# Patient Record
Sex: FEMALE | Race: WHITE | Employment: FULL TIME | URBAN - METROPOLITAN AREA
[De-identification: names, ages, dates, MRNs, and addresses within clinical notes are randomized per-mention and may not be internally consistent; named-entity substitution may affect disease eponyms.]

---

## 2018-08-29 ENCOUNTER — OFFICE VISIT (OUTPATIENT)
Dept: OBGYN CLINIC | Age: 28
End: 2018-08-29

## 2018-08-29 VITALS
WEIGHT: 165.2 LBS | BODY MASS INDEX: 27.52 KG/M2 | HEART RATE: 81 BPM | SYSTOLIC BLOOD PRESSURE: 113 MMHG | DIASTOLIC BLOOD PRESSURE: 78 MMHG | HEIGHT: 65 IN

## 2018-08-29 DIAGNOSIS — Z01.419 WOMEN'S ANNUAL ROUTINE GYNECOLOGICAL EXAMINATION: Primary | ICD-10-CM

## 2018-08-29 DIAGNOSIS — N92.0 MENORRHAGIA WITH REGULAR CYCLE: ICD-10-CM

## 2018-08-29 DIAGNOSIS — N94.6 DYSMENORRHEA: ICD-10-CM

## 2018-08-29 DIAGNOSIS — Z01.419 ENCOUNTER FOR WELL WOMAN EXAM: ICD-10-CM

## 2018-08-29 PROCEDURE — 99395 PREV VISIT EST AGE 18-39: CPT | Performed by: ADVANCED PRACTICE MIDWIFE

## 2018-08-29 RX ORDER — IBUPROFEN 200 MG
200 TABLET ORAL EVERY 6 HOURS PRN
COMMUNITY
End: 2021-07-19

## 2018-08-29 RX ORDER — NORGESTIMATE AND ETHINYL ESTRADIOL 0.25-0.035
1 KIT ORAL DAILY
Qty: 1 PACKET | Refills: 3 | Status: SHIPPED | OUTPATIENT
Start: 2018-08-29 | End: 2018-08-29 | Stop reason: DRUGHIGH

## 2018-08-29 RX ORDER — NORGESTIMATE AND ETHINYL ESTRADIOL 0.25-0.035
1 KIT ORAL DAILY
Qty: 1 PACKET | Refills: 11 | Status: SHIPPED | OUTPATIENT
Start: 2018-08-29 | End: 2019-09-13 | Stop reason: SDUPTHER

## 2018-08-29 NOTE — PROGRESS NOTES
Subjective:     CHIEF COMPLAINT:     Chief Complaint   Patient presents with    Gynecologic Exam     last pap 2014 wnl       HPI    PREVENTIVE HEALTH SCREENING:   Date of last pap: , wnl                HPV typing/date: not done  Abnormal pap smear history: None  Date of last mammogram: None   Date of last colonoscopy: None to date, looking into this given family history    History of Gestational Diabetes: No     If Yes, Glucose screening ordered:No    Preventive screening: No    Family history of Breast, Ovarian, Colon or Uterine Cancer:  Yes, MGM ( from complications of colon CA), some family members with polyps. Father had colon CA but passed away prior to further testing. If Yes see scanned worksheet    Establishing care with this practice as is interested in having more midwives for next pregnancy. No complaints about last practice, just one midwife and had to induce in order to be guaranteed for delivery. Is considering pregnancy in a year or so. Reviewed in depth purpose of pap screen and she is monogamous and only partner is  and vice versa. Would like to skip pap this year. 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 Normal vaginal delivery. Objective:   GYNECOLOGIC HISTORY:   LMP: Patient's last menstrual period was 2018 (exact date). Monthly menses (days): 28 days, JUVENAL    Sexually active: Yes    STD history: No    Hormone Replacement: no    Birth control method :Yes, Selin Hare started at 14 for dysmenorrhea and menorrhagia and off for conception and then back on after delivery. Permanent Sterilization: No    SOCIAL HISTORY:  Seat Belt Use: Yes  Domestic Violence: No    Counseling: No  Regular exercise: Yes, is a runner and does boot camp   Counseling: Yes  Diet discussed: Yes    REVIEW OF SYSTEMS:  1. Constitutional: No fever, chills or malaise. No weight change or fatigue  2. Head and eyes: No headache, dizziness or trauma.   No visual appreciated. Pelvic:  Deferred    Psychiatric:  Alert, oriented to time, place, person and situation. There are no mood or affect changes. Assesment:     Routine annual gynecological exam  Patient Active Problem List   Diagnosis    Encounter for well woman exam     Plan:   1. Return for annual exam.  Pap per current recommendations. 2.  Mammogram: None. SBE was reinforced  3. DEXA scan: None  4. Colonscopy: Will look into with Dr. Esteban Jackson  5. Routine health maintenance: Will look into PCP  6. Hereditary Breast, Ovarian, Colon and Uterine Cancer screening: not done    Patient was seen with total face to face time of 25 minutes. More than 50% of this visit was counseling and education regarding    Diagnosis Orders   1. Women's annual routine gynecological examination  PAP Smear   2.  Encounter for well woman exam

## 2018-08-29 NOTE — LETTER
[facility] and [address] [facility] Danielle Cee [address] [place] [address], [address] [place] [address]  [phone]      August 30, 2018      [facility] [address] [place] [address] 53        Dear Sunday Andujar: Our records indicate that you are due for an annual exam.     The American College of Obstetricians and Gynecologists strongly recommends annual pelvic examination for patient 24years of age and older. Please make an appointment for an annual examination at your earliest convenience. If you are having your annuals done elsewhere, please let us know. Sincerely,    [address] Team     Libby C. Enrique Meigs

## 2018-09-28 PROBLEM — Z01.419 ENCOUNTER FOR WELL WOMAN EXAM: Status: RESOLVED | Noted: 2018-08-29 | Resolved: 2018-09-28

## 2019-09-30 ENCOUNTER — TELEPHONE (OUTPATIENT)
Dept: OBGYN CLINIC | Age: 29
End: 2019-09-30

## 2019-10-01 ENCOUNTER — TELEPHONE (OUTPATIENT)
Dept: OBGYN CLINIC | Age: 29
End: 2019-10-01

## 2019-10-23 ENCOUNTER — OFFICE VISIT (OUTPATIENT)
Dept: OBGYN CLINIC | Age: 29
End: 2019-10-23

## 2019-10-23 VITALS
SYSTOLIC BLOOD PRESSURE: 127 MMHG | DIASTOLIC BLOOD PRESSURE: 85 MMHG | WEIGHT: 163.3 LBS | HEIGHT: 66 IN | HEART RATE: 92 BPM | BODY MASS INDEX: 26.24 KG/M2

## 2019-10-23 DIAGNOSIS — N92.0 MENORRHAGIA WITH REGULAR CYCLE: ICD-10-CM

## 2019-10-23 DIAGNOSIS — Z01.419 WOMEN'S ANNUAL ROUTINE GYNECOLOGICAL EXAMINATION: Primary | ICD-10-CM

## 2019-10-23 DIAGNOSIS — N94.6 DYSMENORRHEA: ICD-10-CM

## 2019-10-23 PROCEDURE — 99395 PREV VISIT EST AGE 18-39: CPT | Performed by: ADVANCED PRACTICE MIDWIFE

## 2019-10-23 RX ORDER — NORGESTIMATE AND ETHINYL ESTRADIOL 0.25-0.035
1 KIT ORAL DAILY
Qty: 84 TABLET | Refills: 3 | Status: SHIPPED | OUTPATIENT
Start: 2019-10-23 | End: 2021-07-19

## 2019-10-23 ASSESSMENT — PATIENT HEALTH QUESTIONNAIRE - PHQ9
1. LITTLE INTEREST OR PLEASURE IN DOING THINGS: 0
SUM OF ALL RESPONSES TO PHQ QUESTIONS 1-9: 0
SUM OF ALL RESPONSES TO PHQ QUESTIONS 1-9: 0
2. FEELING DOWN, DEPRESSED OR HOPELESS: 0
SUM OF ALL RESPONSES TO PHQ9 QUESTIONS 1 & 2: 0

## 2021-07-19 ENCOUNTER — OFFICE VISIT (OUTPATIENT)
Dept: OBGYN CLINIC | Age: 31
End: 2021-07-19
Payer: COMMERCIAL

## 2021-07-19 VITALS
HEART RATE: 78 BPM | HEIGHT: 66 IN | SYSTOLIC BLOOD PRESSURE: 112 MMHG | DIASTOLIC BLOOD PRESSURE: 64 MMHG | BODY MASS INDEX: 27.58 KG/M2 | WEIGHT: 171.6 LBS

## 2021-07-19 DIAGNOSIS — Z80.0 FAMILY HISTORY OF COLON CANCER: ICD-10-CM

## 2021-07-19 DIAGNOSIS — Z01.419 WOMEN'S ANNUAL ROUTINE GYNECOLOGICAL EXAMINATION: Primary | ICD-10-CM

## 2021-07-19 DIAGNOSIS — Z31.69 PRE-CONCEPTION COUNSELING: ICD-10-CM

## 2021-07-19 PROBLEM — Z31.9 PATIENT DESIRES PREGNANCY: Status: ACTIVE | Noted: 2021-07-19

## 2021-07-19 PROCEDURE — 99395 PREV VISIT EST AGE 18-39: CPT | Performed by: ADVANCED PRACTICE MIDWIFE

## 2021-07-19 RX ORDER — PNV NO.95/FERROUS FUM/FOLIC AC 28MG-0.8MG
1 TABLET ORAL DAILY
COMMUNITY

## 2021-07-19 SDOH — ECONOMIC STABILITY: TRANSPORTATION INSECURITY
IN THE PAST 12 MONTHS, HAS LACK OF TRANSPORTATION KEPT YOU FROM MEETINGS, WORK, OR FROM GETTING THINGS NEEDED FOR DAILY LIVING?: NO

## 2021-07-19 SDOH — ECONOMIC STABILITY: TRANSPORTATION INSECURITY
IN THE PAST 12 MONTHS, HAS THE LACK OF TRANSPORTATION KEPT YOU FROM MEDICAL APPOINTMENTS OR FROM GETTING MEDICATIONS?: NO

## 2021-07-19 SDOH — ECONOMIC STABILITY: FOOD INSECURITY: WITHIN THE PAST 12 MONTHS, THE FOOD YOU BOUGHT JUST DIDN'T LAST AND YOU DIDN'T HAVE MONEY TO GET MORE.: NEVER TRUE

## 2021-07-19 SDOH — ECONOMIC STABILITY: FOOD INSECURITY: WITHIN THE PAST 12 MONTHS, YOU WORRIED THAT YOUR FOOD WOULD RUN OUT BEFORE YOU GOT MONEY TO BUY MORE.: NEVER TRUE

## 2021-07-19 ASSESSMENT — PATIENT HEALTH QUESTIONNAIRE - PHQ9
SUM OF ALL RESPONSES TO PHQ QUESTIONS 1-9: 0
SUM OF ALL RESPONSES TO PHQ9 QUESTIONS 1 & 2: 0
SUM OF ALL RESPONSES TO PHQ QUESTIONS 1-9: 0
2. FEELING DOWN, DEPRESSED OR HOPELESS: 0
SUM OF ALL RESPONSES TO PHQ QUESTIONS 1-9: 0
1. LITTLE INTEREST OR PLEASURE IN DOING THINGS: 0

## 2021-07-19 ASSESSMENT — ENCOUNTER SYMPTOMS
ANAL BLEEDING: 0
ABDOMINAL PAIN: 0
GASTROINTESTINAL NEGATIVE: 1
RESPIRATORY NEGATIVE: 1
DIARRHEA: 0
NAUSEA: 0
ALLERGIC/IMMUNOLOGIC NEGATIVE: 1
BLOOD IN STOOL: 0
CONSTIPATION: 0
EYES NEGATIVE: 1

## 2021-07-19 ASSESSMENT — ANXIETY QUESTIONNAIRES
4. TROUBLE RELAXING: 0-NOT AT ALL
6. BECOMING EASILY ANNOYED OR IRRITABLE: 0-NOT AT ALL
7. FEELING AFRAID AS IF SOMETHING AWFUL MIGHT HAPPEN: 0-NOT AT ALL
3. WORRYING TOO MUCH ABOUT DIFFERENT THINGS: 0-NOT AT ALL
5. BEING SO RESTLESS THAT IT IS HARD TO SIT STILL: 0-NOT AT ALL
2. NOT BEING ABLE TO STOP OR CONTROL WORRYING: 0-NOT AT ALL
1. FEELING NERVOUS, ANXIOUS, OR ON EDGE: 0-NOT AT ALL
GAD7 TOTAL SCORE: 0

## 2021-07-19 ASSESSMENT — SOCIAL DETERMINANTS OF HEALTH (SDOH)
HOW HARD IS IT FOR YOU TO PAY FOR THE VERY BASICS LIKE FOOD, HOUSING, MEDICAL CARE, AND HEATING?: NOT HARD AT ALL
WITHIN THE LAST YEAR, HAVE YOU BEEN AFRAID OF YOUR PARTNER OR EX-PARTNER?: NO
WITHIN THE LAST YEAR, HAVE TO BEEN RAPED OR FORCED TO HAVE ANY KIND OF SEXUAL ACTIVITY BY YOUR PARTNER OR EX-PARTNER?: NO
WITHIN THE LAST YEAR, HAVE YOU BEEN KICKED, HIT, SLAPPED, OR OTHERWISE PHYSICALLY HURT BY YOUR PARTNER OR EX-PARTNER?: NO
WITHIN THE LAST YEAR, HAVE YOU BEEN HUMILIATED OR EMOTIONALLY ABUSED IN OTHER WAYS BY YOUR PARTNER OR EX-PARTNER?: NO

## 2021-07-19 NOTE — PROGRESS NOTES
[facility] AND GYNECOLOGY   [facility] 77   [address] [place] [address] [id number] [id number]  Dept: [phone]    Patient Name: Elayne Garber  Patient Age: 31 y.o. Date of Visit: 7/19/2021    Subjective  Chief Complaint   Patient presents with    Gynecologic Exam     Last Pap: ?      HPI:  Elayne Garber is a 31 y.o. female who arrives to office as an established patient for annual exam.     Patient admits to concerns today. Reports she and her  have been trying for a baby since 9/2020. She has been tracking her cycles and they are regular, is able to identify egg-white cervical mucous. Taking prenatal vitamin most days. Is concerned because it has been almost a year. Patient's last menstrual period was 07/09/2021 (exact date). Reports they have sex about twice a week but may not be \"exactly timed with ovulation\". He is the father of her other child (2014). Reports last pregnancy she stopped her OCPs and got pregnant right away. Is maintaining healthy lifestyle, did a half marathon in April.  is healthy as well. Patient denies a history of sexually transmitted infection(s). Patient does not want screening for sexually transmitted infection(s). Reports maternal grandmother had colon cancer very young (reporting 8yo?). She reports mother had colon polyps as well as both of her sisters and her brother. She also reports her father was dying from heart failure and was just diagnosed with colon cancer then he passed away. She would like a referral to GI. She has not had genetic testing, is self pay. Does not want to proceed with pap today due to self pay status and monogamy x 9 years with no hx abnormal pap. Review of Systems   Constitutional: Negative. Negative for fatigue. HENT: Negative. Eyes: Negative. Respiratory: Negative. Cardiovascular: Negative. Gastrointestinal: Negative.   Negative for abdominal pain, anal bleeding, blood in stool, constipation, diarrhea and nausea. Endocrine: Negative. Negative for cold intolerance and heat intolerance. Genitourinary: Negative for dyspareunia, flank pain, frequency, genital sores, menstrual problem (desires pregnancy), pelvic pain, vaginal bleeding and vaginal discharge. Musculoskeletal: Negative. Skin: Negative. Allergic/Immunologic: Negative. Neurological: Negative. Hematological: Negative. Psychiatric/Behavioral: Negative. Preventive Health Screening:   Date of last pap: normal 8/11/2017  History of abnormal pap: denies          HPV typing/date: due  Date of last mammogram: N/A   Date of last DEXA scan: N/A   Date of last colonoscopy: N/A    History of Gestational Diabetes: No     If Yes, Glucose screening ordered: N/A    Preventive screening: no current PCP, no insurance     Family history of Breast, Ovarian, Colon or Uterine Cancer: Fhx colon cancer (father, MGMA) See Plan below     If Yes see scanned worksheet    PHQ Scores 7/19/2021 10/23/2019   PHQ2 Score 0 0   PHQ9 Score 0 0     Interpretation of Total Score Depression Severity: 1-4 = Minimal depression, 5-9 = Mild depression, 10-14 = Moderate depression, 15-19 = Moderately severe depression, 20-27 = Severe depression  CEDRIC 7 SCORE 7/19/2021   CEDRIC-7 Total Score 0     Interpretation of CEDRIC-7 score: 5-9 = mild anxiety, 10-14 = moderate anxiety, 15+ = severe anxiety. Recommend referral to behavioral health for scores 10 or greater.      Social Determinants of Health:   Social History     Tobacco Use   Smoking Status Never Smoker   Smokeless Tobacco Never Used      Social History     Substance and Sexual Activity   Alcohol Use Yes      Social History     Substance and Sexual Activity   Drug Use Never         Financial Resource Strain: Low Risk     Difficulty of Paying Living Expenses: Not hard at all         Food Insecurity: No Food Insecurity    Worried About Running Out of Food in the Last Year: Never true    Ran Out of Food in the Last Year: Never true         Transportation Needs: No Transportation Needs    Lack of Transportation (Medical): No    Lack of Transportation (Non-Medical): No         Intimate Partner Violence: Not At Risk    Fear of Current or Ex-Partner: No    Emotionally Abused: No    Physically Abused: No    Sexually Abused: No       Objective  Blood pressure 112/64, pulse 78, height 5' 6\" (1.676 m), weight 171 lb 9.6 oz (77.8 kg), last menstrual period 07/09/2021, not currently breastfeeding. Patient's last menstrual period was 07/09/2021 (exact date). Body mass index is 27.7 kg/m². Current Outpatient Medications on File Prior to Visit   Medication Sig Dispense Refill    Prenatal Vit-Fe Fumarate-FA (PRENATAL VITAMIN) 27-0.8 MG TABS Take 1 tablet by mouth daily       No current facility-administered medications on file prior to visit. History reviewed. No pertinent past medical history. Gynecologic History:  Monthly menses (days): regular 28-29 days  Length: 3-5 days  Flow: moderate    Sexually active: Yes ( only)  New Sex Partner within 3 months: No  Domestic Violence Screening: negative    STD history: No     Birth control method: No desires pregnancy    Physical Exam  Vitals and nursing note reviewed. Constitutional:       Appearance: Normal appearance. She is normal weight. HENT:      Head: Normocephalic and atraumatic. Cardiovascular:      Rate and Rhythm: Normal rate and regular rhythm. Heart sounds: Normal heart sounds. No murmur heard. Pulmonary:      Effort: Pulmonary effort is normal. No respiratory distress. Breath sounds: Normal breath sounds. Chest:      Breasts: Breasts are symmetrical.         Right: Normal. No mass, nipple discharge or skin change. Left: Normal. No mass, nipple discharge or skin change. Abdominal:      General: Abdomen is flat. Palpations: Abdomen is soft. Genitourinary:     General: Normal vulva.       Exam pregnancy      Cancer:  [x] Cervical cancer screening: aware of ASCCP guidelines and would like to defer pap another year, aware she is due for cytology and HPV testing  [] Mammograms (started at 38yrs old)  [x] BRCA testing risk assessment: declined d/t self pay status, see below   [x] Colon cancer screening: referral to GI sent per pt request   [] Skin Cancer Screening: not discussed    Pre-conception counseling  · Reassurance provided that she is having monthly cycles with clear signs of ovulation. Reviewed intercourse every other day during fertile window (5 days before ovulation, and 2 days following). Recommend stricter timing of coitus to increase chances of conception. · Reviewed normalcy of it taking up to a year to conceive even without anything wrong. She verbalizes understanding, since it has been almost a year she'd like to proceed with semen analysis (free flyer given for [facility]), and TSH only at this time. · Continue to maintain healthy lifestyle and healthy weight, and taking prenatal vitamin daily. · No reported family history of chromosomal aberrations in herself or her  or their respective families. ·  is 26 yo, non-smoker, and minimal alcohol use. He is the father of her other child. No pregnancy complications were reported. · Plan for semen analysis, TSH, and continue timed coitus, can RTO if > 1 year of timed coitus or as desired. Family history of colon cancer  · Reports MGM had colon cancer very young, and strong family hx colon polyps. Father dx colon cancer before he passed away. · Not able to afford genetic testing at this time (self pay)  · Did not report any concerning GI symptoms, but does want GI referral for colonoscopy and to establish care. Return for + UPT or after 1 year of timed coitus with no conception . Problem list reviewed and updated as indicated. Upon completion of the visit all questions were answered.   History was reviewed as documented on Epic Navigator. The patient, Zoë Barrios, was seen with a total time spent of 60 minutes for the visit on this date of service by the [facility]  The time component involved both face-to-face (counseling and education) and non face-to-face time (care coordination), spent in determining the total time component.       Electronically Signed by: ERVIN Green CNM

## 2021-10-05 ENCOUNTER — TELEPHONE (OUTPATIENT)
Dept: OBGYN CLINIC | Age: 31
End: 2021-10-05

## 2022-01-07 ENCOUNTER — HOSPITAL ENCOUNTER (OUTPATIENT)
Age: 32
Setting detail: SPECIMEN
Discharge: HOME | End: 2022-01-07

## 2022-01-07 ENCOUNTER — OFFICE VISIT (OUTPATIENT)
Dept: OBGYN CLINIC | Age: 32
End: 2022-01-07
Payer: COMMERCIAL

## 2022-01-07 VITALS
DIASTOLIC BLOOD PRESSURE: 68 MMHG | SYSTOLIC BLOOD PRESSURE: 107 MMHG | BODY MASS INDEX: 28.37 KG/M2 | WEIGHT: 175.8 LBS | HEART RATE: 68 BPM

## 2022-01-07 DIAGNOSIS — O98.911 PREGNANCY AND INFECTIOUS DISEASE, FIRST TRIMESTER: ICD-10-CM

## 2022-01-07 DIAGNOSIS — N91.2 AMENORRHEA: Primary | ICD-10-CM

## 2022-01-07 DIAGNOSIS — N91.2 AMENORRHEA: ICD-10-CM

## 2022-01-07 PROBLEM — Z59.89 HAS HEALTH INSURANCE WITH INADEQUATE COVERAGE OF HEALTH EXPENSES: Status: ACTIVE | Noted: 2022-01-07

## 2022-01-07 PROBLEM — Z31.9 PATIENT DESIRES PREGNANCY: Status: RESOLVED | Noted: 2021-07-19 | Resolved: 2022-01-07

## 2022-01-07 PROBLEM — Z59.71 HAS HEALTH INSURANCE WITH INADEQUATE COVERAGE OF HEALTH EXPENSES: Status: ACTIVE | Noted: 2022-01-07

## 2022-01-07 PROBLEM — Z34.80 NORMAL PREGNANCY IN MULTIGRAVIDA, ANTEPARTUM: Status: ACTIVE | Noted: 2022-01-07

## 2022-01-07 LAB
CONTROL: YES
PREGNANCY TEST URINE, POC: POSITIVE

## 2022-01-07 PROCEDURE — 81025 URINE PREGNANCY TEST: CPT | Performed by: ADVANCED PRACTICE MIDWIFE

## 2022-01-07 PROCEDURE — 99213 OFFICE O/P EST LOW 20 MIN: CPT | Performed by: ADVANCED PRACTICE MIDWIFE

## 2022-01-07 NOTE — PROGRESS NOTES
SUBJECTIVE:    Chief Complaint:  Chief Complaint   Patient presents with    Amenorrhea       HPI:    Precious Rockwell  is being seen today for missed menses. She reports a  positive pregnancy test on 12/15. This  is a planned pregnancy. She is  accepting at this time. LMP: 21   Sure and definite: Yes     28 day cycle: Yes    She was not on a contraceptive at the time of conception. Estimated weeks gestation today : 7.1 week  Tentative SHIRA: 22    Relationship with FOB: Piotr    OBJECTIVE:    VS as documented in Epic. Mother's ethnicity:    Father's ethnicity:      She is complaining today of:   Pain: No  Cramping: Yes  Bleeding or spotting: No    Nausea: Yes  Vomiting: No    Breast enlargement/tenderness: Yes  Fatigue: Yes  Frequency of urination: Yes    Previous high risk obstetrical history: None    OB History    Para Term  AB Living   1 1 1     1   SAB IAB Ectopic Molar Multiple Live Births             1      # Outcome Date GA Lbr Ottoniel/2nd Weight Sex Delivery Anes PTL Lv   1 Term 03/24/15   7 lb 14 oz (3.572 kg) F Vag-Spont   ARABELLA       ROS was done and is negative except as documented in HPI. History was reviewed as documented on Epic Navigator. ASSESSMENT/PLAN:  Missed menses with + UCG  · UCG was done and noted as positive  · No results found for: PREGTESTUR, PREGSERUM, HCG, HCGQUANT   · Prenatal vitamins  already taking: Yes  · Ultrasound for dating and viability was ordered: Offered and declined  · OB form was given:  Yes  · Initial information on genetic testing was given:Yes and decliines  · Information and education on prenatal labs was done; consent for UDS and HIV obtained: Yes, given insurance issues, declines all but necessary ones  · 1 hour glucola was ordered: No  · She was instructed to call with any concerns or worsening of any symptoms  · Multiple questions answered today regarding exercise (runing), diet, etc  · She will return for New OB intake visit  Lack of insurance  · [facility]     Patient was seen with total face to face time of 20 minutes. More than 50% of this visit was spent face to face coordinating plan of care and answering questions regarding     Diagnosis Orders   1. Amenorrhea  POCT urine pregnancy    HIV Screen    PRENATAL PROFILE I    Culture, Urine    Urine Drug Screen   2. Pregnancy and infectious disease, first trimester      . She was also counseled on her preventative health maintenance recommendations and follow-up.

## 2022-01-07 NOTE — PROGRESS NOTES
Patient is here for her confirm OB appt. Last menses was 11/18/2021. Positive UPt in the office. Patient already on a PNV. No concerns.

## 2022-01-08 LAB
AMPHETAMINE SCREEN URINE: NEGATIVE
BARBITURATE SCREEN URINE: NEGATIVE
BENZODIAZEPINE SCREEN, URINE: NEGATIVE
BUPRENORPHINE URINE: NORMAL
CANNABINOID SCREEN URINE: NEGATIVE
COCAINE METABOLITE, URINE: NEGATIVE
MDMA URINE: NORMAL
METHADONE SCREEN, URINE: NEGATIVE
METHAMPHETAMINE, URINE: NORMAL
OPIATES, URINE: NEGATIVE
OXYCODONE SCREEN URINE: NEGATIVE
PHENCYCLIDINE, URINE: NEGATIVE
PROPOXYPHENE, URINE: NORMAL
TEST INFORMATION: NORMAL
TRICYCLIC ANTIDEPRESSANTS, UR: NORMAL

## 2022-01-09 LAB
CULTURE: NORMAL
Lab: NORMAL
SPECIMEN DESCRIPTION: NORMAL

## 2022-01-31 ENCOUNTER — TELEPHONE (OUTPATIENT)
Dept: GASTROENTEROLOGY | Age: 32
End: 2022-01-31

## 2022-02-02 ENCOUNTER — TELEPHONE (OUTPATIENT)
Dept: OBGYN CLINIC | Age: 32
End: 2022-02-02

## 2022-02-02 NOTE — TELEPHONE ENCOUNTER
Pt states that she has been getting headaches. She states she doesn't usually get headaches. She notices that she gets them when weather changes. She had a severe headache the past two times during intercourse at the time of orgasm. She states it happened last night and it is still going into this morning. She has tried to take tylenol and pain decreases but doesn't go away completely. She thinks she is drinking enough water but states she could drink more.

## 2022-02-02 NOTE — TELEPHONE ENCOUNTER
Tried to call Sergio Pedraza back and could not leave VM due to box being full. If she calls back, let her know we oftentimes see increase in headaches during early pregnancy due to the high level of hormones. She can use Tylenol as needed and add a magnesium supplement (200 or 400 mg per day is fine) to see if that helps. A small amount of caffeine such as a cup of tea or coffee may help as well. Increasing water intake will help as well. Regarding headache with orgasm, let her know that is something that can happen and usually comes in spurts (such as she'll have them for a few months periodically then they'll go away). Most of the time nothing is wrong at all but if they don't go away or she has accompanying symptom such as weakness, dizziness, slurred speech, then she'll have to call us.   Thanks

## 2022-02-18 ENCOUNTER — INITIAL PRENATAL (OUTPATIENT)
Dept: OBGYN CLINIC | Age: 32
End: 2022-02-18

## 2022-02-18 ENCOUNTER — HOSPITAL ENCOUNTER (OUTPATIENT)
Age: 32
Setting detail: SPECIMEN
Discharge: HOME | End: 2022-02-18

## 2022-02-18 VITALS
DIASTOLIC BLOOD PRESSURE: 78 MMHG | WEIGHT: 171.7 LBS | SYSTOLIC BLOOD PRESSURE: 119 MMHG | HEART RATE: 91 BPM | BODY MASS INDEX: 27.71 KG/M2

## 2022-02-18 DIAGNOSIS — U07.1 COVID-19 AFFECTING PREGNANCY IN FIRST TRIMESTER: ICD-10-CM

## 2022-02-18 DIAGNOSIS — Z80.0 FAMILY HISTORY OF COLON CANCER: ICD-10-CM

## 2022-02-18 DIAGNOSIS — N91.2 AMENORRHEA: ICD-10-CM

## 2022-02-18 DIAGNOSIS — Z59.89 HAS HEALTH INSURANCE WITH INADEQUATE COVERAGE OF HEALTH EXPENSES: ICD-10-CM

## 2022-02-18 DIAGNOSIS — O98.511 COVID-19 AFFECTING PREGNANCY IN FIRST TRIMESTER: ICD-10-CM

## 2022-02-18 DIAGNOSIS — Z34.80 NORMAL PREGNANCY IN MULTIGRAVIDA, ANTEPARTUM: Primary | ICD-10-CM

## 2022-02-18 DIAGNOSIS — Z3A.13 13 WEEKS GESTATION OF PREGNANCY: ICD-10-CM

## 2022-02-18 PROCEDURE — 0500F INITIAL PRENATAL CARE VISIT: CPT | Performed by: ADVANCED PRACTICE MIDWIFE

## 2022-02-18 SDOH — ECONOMIC STABILITY - INCOME SECURITY: OTHER PROBLEMS RELATED TO HOUSING AND ECONOMIC CIRCUMSTANCES: Z59.89

## 2022-02-18 NOTE — PROGRESS NOTES
Pt is here today at her 13w1d  Pt states fetal movement is not present  Pt has questions regarding headaches & being able to exercise/running     epds-0

## 2022-02-18 NOTE — PROGRESS NOTES
[facility] AND GYNECOLOGY  [facility] 77    [address] [address]  Dept: [phone]    New Obstetric Intake     Subjective:    Patient Antonio Hoahaoism  Patient Age: 27 y.o. Date of Visit: 2022  Patient's estimated gestational age at visit: 13w1d    Any Concerns Today: yes - had severe headache during/after orgasm that started end of January, happened twice. Able to have intercourse but has been avoiding orgasm in case it happens again. No other neuro sx reported. Comfortable with monitoring and will reach out to [facility] if persists. Had COVID-19 sx starting 22, tested positive at home 2/10/22, denies residual symptoms. She denies spotting, cramping or pain. [facility]. Declines dating U/S due to insurance issues. Thinks anatomy scan at [facility] will be covered. Necessary labs only (Prenatal profile  HIV, U/C, and UDS ordered by [id number])      OB History        2    Para   1    Term   1            AB        Living   1       SAB        IAB        Ectopic        Molar        Multiple        Live Births   1                Information about this pregnancy:    Planned Pregnancy: Yes, Accepting: Yes   Father of Baby: Niya Guillen and is involved with the pregnancy   Patient's last menstrual period was 2021., Regular menses:  Yes   Date of Last Pap Smear: 2017 normal   On Birth Control at Time of Conception: no   Early Dating Ultrasound: declined see above   Desires first trimester screening: No   Desires CF/SMA/FragileX screening: No    Risk Screening   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, Environmental/Work Hazards: COVID-19 only    Smoker: No   History of Substance Abuse: no   History of Sexual Abuse: no   Current of past history of intimate partner violence: no   Teratogen Exposure since finding out pregnant: no   Pets at home: yes - dog and cat     Infection History:   Personal History of Chicken Pox or varicella vaccination: Yes had pox   Agreeable to flu shot: No   Agreeable to tdap: discuss later    COVID-19 vaccine: no   Exposed to TB or live with someone with TB: no   Patient or partner history of genital herpes: no   Rash or viral illness since last menstrual period: no   Prior GBS-infected child: no   History of sexually transmitted infection(s) (STIs): no    Any recent travel within the last 3 months out of the country: no, Partner: no    Previous Birth History:    Vaginal Birth: yes    Birth: no   Any previous birth complications: no   History of hemorrhage during/after birth: no    High Risk Factor Screening for this Pregnancy:   Age greater than 28 at delivery: No    History of  delivery: no    History of diabetes (gestational or outside of pregnancy): No, On medications: No   Screening for pregestational diabetes:   o BMI greater than 30: No. If Yes, need early glucola  o BMI greater than 25 ( Americans greater than 23): Yes If Yes, need 1 more risk factor.   - Physical inactivity: No  - First-degree relative with diabetes: Yes  - High-risk race of ethnicity (eg, [place] American, , , [place] American, [facility]): No  - Previously given birth to an infant weighing 4gxa82cn (4000g) or more: No  - History of hypertension: No  - HDL < 35mg/dL and/or a trglyceride level greater than 250 mg/dL: No  - History of polycystic ovarian syndrome: No  - A1c greater than or equal to 5.7%: No   History of Hypertension: No, On medications: No   History of bleeding disorders: No    See Epic Navigator for further genetic and risk screening. Objective:  /78   Pulse 91   Wt 171 lb 11.2 oz (77.9 kg)   LMP 2021   BMI 27.71 kg/m²   Body mass index is 27.71 kg/m².     See OB Physical Exam - proven pelvis 7#14, pap PP    Mother's Ethnicity:   Father's Ethnicity:     Assessment/Plan:  Pregnancy at 13w1d    Role of ultrasound in pregnancy discussed; fetal survey: requests   She was counseled on office policies. She was educated about the anticipated course of prenatal care.  Warning signs were reviewed.  The patient was counseled on drug screening, HIV, Cystic Fibrosis, SMA and Sickle Cell disease, as appropriate.   o HIV drawn with prior consent, UDS declined d/t insurance see below  o She declined CF/SMA/Fragile X testing.  She was counseled on Toxoplasmosis/Listeriosis (cats/raw meat).  She denies tobacco use.  She was questioned in detail regarding any genetic misnomer history, chromosomal abnormalities, or learning disabilities in herself, the father of the baby or their families. She denies any history as stated above.  Declines 1st tri screen, NIPS, and AFP   Encouraged well-balanced diet, plenty of rest when needed, prenatal vitamins daily and walking for exercise. Discussed self-help for nausea, avoiding OTC medications until consulting provider or pharmacist, other than Tylenol PRN, minimal caffeine (1-2 cups daily) and avoiding alcohol. Discussed exercise safety in pregnancy.  The patient was informed that [facility] only delivers at [facility] and is agreeable to delivery at [facility].     Normal pregnancy in multigravida, antepartum  · Ambulatory referral to Maternal Fetal  · Prenatal profile and HIV drawn today (ordered by Luisa Frank at last visit)    COVID-19 affecting pregnancy in first trimester  · Sx started 2/5/21, positive home test 2/10/22  · No residual symptoms    Self-Pay  · Minimal labs  · Declined dating U/S  · Referral to MFM placed for anatomy scan, she thinks this will be covered [facility])  · Pap PP    Family history of colon cancer  · Plans for colonoscopy postpartum  · Not sure if genetics are covered by insurance may need to follow up PP    Upon completion of the visit all questions were answered. ROS was done and is negative except as documented in HPI. History was reviewed as documented on Epic Navigator. The patient, Nataliya Daly, was seen with a total time spent of 40 minutes for the visit on this date of service by the [facility]  The time component involved both face-to-face (counseling and education) and non face-to-face time (care coordination), spent in determining the total time component. Return in about 4 weeks (around 3/18/2022) for OB visit with Midwives.     Electronically Signed by: ERVIN Louise CNM

## 2022-02-19 LAB
ABO/RH: NORMAL
ABSOLUTE EOS #: 0.04 K/UL (ref 0–0.44)
ABSOLUTE IMMATURE GRANULOCYTE: <0.03 K/UL (ref 0–0.3)
ABSOLUTE LYMPH #: 1.47 K/UL (ref 1.1–3.7)
ABSOLUTE MONO #: 0.28 K/UL (ref 0.1–1.2)
ANTIBODY SCREEN: NEGATIVE
BASOPHILS # BLD: 1 % (ref 0–2)
BASOPHILS ABSOLUTE: 0.03 K/UL (ref 0–0.2)
DIFFERENTIAL TYPE: ABNORMAL
EOSINOPHILS RELATIVE PERCENT: 1 % (ref 1–4)
HCT VFR BLD CALC: 39.8 % (ref 36.3–47.1)
HEMOGLOBIN: 13 G/DL (ref 11.9–15.1)
HEPATITIS B SURFACE ANTIGEN: NONREACTIVE
HIV AG/AB: NONREACTIVE
IMMATURE GRANULOCYTES: 0 %
LYMPHOCYTES # BLD: 27 % (ref 24–43)
MCH RBC QN AUTO: 30.8 PG (ref 25.2–33.5)
MCHC RBC AUTO-ENTMCNC: 32.7 G/DL (ref 28.4–34.8)
MCV RBC AUTO: 94.3 FL (ref 82.6–102.9)
MONOCYTES # BLD: 5 % (ref 3–12)
NRBC AUTOMATED: 0 PER 100 WBC
PDW BLD-RTO: 12.6 % (ref 11.8–14.4)
PLATELET # BLD: 197 K/UL (ref 138–453)
PLATELET ESTIMATE: ABNORMAL
PMV BLD AUTO: 12.3 FL (ref 8.1–13.5)
RBC # BLD: 4.22 M/UL (ref 3.95–5.11)
RBC # BLD: ABNORMAL 10*6/UL
RUBV IGG SER QL: 35.6 IU/ML
SEG NEUTROPHILS: 66 % (ref 36–65)
SEGMENTED NEUTROPHILS ABSOLUTE COUNT: 3.61 K/UL (ref 1.5–8.1)
T. PALLIDUM, IGG: NONREACTIVE
WBC # BLD: 5.5 K/UL (ref 3.5–11.3)
WBC # BLD: ABNORMAL 10*3/UL

## 2022-02-21 PROBLEM — Z67.91 RH NEGATIVE STATE IN ANTEPARTUM PERIOD: Status: ACTIVE | Noted: 2022-02-21

## 2022-02-21 PROBLEM — O26.899 RH NEGATIVE STATE IN ANTEPARTUM PERIOD: Status: ACTIVE | Noted: 2022-02-21

## 2022-03-17 ENCOUNTER — ROUTINE PRENATAL (OUTPATIENT)
Dept: OBGYN CLINIC | Age: 32
End: 2022-03-17
Payer: COMMERCIAL

## 2022-03-17 VITALS
SYSTOLIC BLOOD PRESSURE: 108 MMHG | WEIGHT: 172.5 LBS | DIASTOLIC BLOOD PRESSURE: 69 MMHG | HEART RATE: 86 BPM | BODY MASS INDEX: 27.84 KG/M2

## 2022-03-17 DIAGNOSIS — Z67.91 RH NEGATIVE STATE IN ANTEPARTUM PERIOD: ICD-10-CM

## 2022-03-17 DIAGNOSIS — Z34.80 NORMAL PREGNANCY IN MULTIGRAVIDA, ANTEPARTUM: Primary | ICD-10-CM

## 2022-03-17 DIAGNOSIS — Z59.89 HAS HEALTH INSURANCE WITH INADEQUATE COVERAGE OF HEALTH EXPENSES: ICD-10-CM

## 2022-03-17 DIAGNOSIS — Z3A.17 17 WEEKS GESTATION OF PREGNANCY: ICD-10-CM

## 2022-03-17 DIAGNOSIS — O26.899 RH NEGATIVE STATE IN ANTEPARTUM PERIOD: ICD-10-CM

## 2022-03-17 PROCEDURE — 0502F SUBSEQUENT PRENATAL CARE: CPT | Performed by: ADVANCED PRACTICE MIDWIFE

## 2022-03-17 SDOH — ECONOMIC STABILITY - INCOME SECURITY: OTHER PROBLEMS RELATED TO HOUSING AND ECONOMIC CIRCUMSTANCES: Z59.89

## 2022-04-18 ENCOUNTER — ROUTINE PRENATAL (OUTPATIENT)
Dept: PERINATAL CARE | Age: 32
End: 2022-04-18
Payer: COMMERCIAL

## 2022-04-18 VITALS
WEIGHT: 177 LBS | DIASTOLIC BLOOD PRESSURE: 66 MMHG | TEMPERATURE: 97.3 F | HEIGHT: 66 IN | BODY MASS INDEX: 28.45 KG/M2 | RESPIRATION RATE: 16 BRPM | SYSTOLIC BLOOD PRESSURE: 112 MMHG | HEART RATE: 78 BPM

## 2022-04-18 DIAGNOSIS — O44.00 PLACENTA PREVIA WITHOUT HEMORRHAGE, ANTEPARTUM: Primary | ICD-10-CM

## 2022-04-18 DIAGNOSIS — Z36.86 ENCOUNTER FOR SCREENING FOR RISK OF PRE-TERM LABOR: ICD-10-CM

## 2022-04-18 DIAGNOSIS — Z3A.21 21 WEEKS GESTATION OF PREGNANCY: ICD-10-CM

## 2022-04-18 LAB
ABDOMINAL CIRCUMFERENCE: NORMAL
ABDOMINAL CIRCUMFERENCE: NORMAL
BIPARIETAL DIAMETER: NORMAL
BIPARIETAL DIAMETER: NORMAL
ESTIMATED FETAL WEIGHT: NORMAL
ESTIMATED FETAL WEIGHT: NORMAL
FEMORAL DIAMETER: NORMAL
FEMORAL DIAMETER: NORMAL
HC/AC: NORMAL
HC/AC: NORMAL
HEAD CIRCUMFERENCE: NORMAL
HEAD CIRCUMFERENCE: NORMAL

## 2022-04-18 PROCEDURE — 76811 OB US DETAILED SNGL FETUS: CPT | Performed by: OBSTETRICS & GYNECOLOGY

## 2022-04-18 PROCEDURE — 99999 PR OFFICE/OUTPT VISIT,PROCEDURE ONLY: CPT | Performed by: OBSTETRICS & GYNECOLOGY

## 2022-04-18 PROCEDURE — 76817 TRANSVAGINAL US OBSTETRIC: CPT | Performed by: OBSTETRICS & GYNECOLOGY

## 2022-04-18 NOTE — PROGRESS NOTES
Advise early 1-hour glucola (if not already done) to evaluate for pregestational diabetes (first degree relative with diabetes). Patient declined non-invasive prenatal testing/NIPT (cell-free DNA screening) that is offered to all pregnant patients irrespective of baseline risk or maternal age (Obstet [de-identified] 26; 80; ACOG Practice Bulletin Number 226, Screening for Fetal Chromosomal Abnormalities). Patient has declined non-invasive prenatal diagnosis testing (with the [facility]) today. Patient has declined the Five Gene Carrier Screen (with the Mason test from [address] [place] [address]) today. MSAFP (maternal serum alpha-feto protein level) lab draw declined by patient.

## 2022-04-19 ENCOUNTER — ROUTINE PRENATAL (OUTPATIENT)
Dept: OBGYN CLINIC | Age: 32
End: 2022-04-19

## 2022-04-19 VITALS
SYSTOLIC BLOOD PRESSURE: 115 MMHG | WEIGHT: 178 LBS | BODY MASS INDEX: 28.73 KG/M2 | DIASTOLIC BLOOD PRESSURE: 78 MMHG | HEART RATE: 87 BPM

## 2022-04-19 DIAGNOSIS — O26.899 RH NEGATIVE STATE IN ANTEPARTUM PERIOD: ICD-10-CM

## 2022-04-19 DIAGNOSIS — O09.90 HRP (HIGH RISK PREGNANCY), UNSPECIFIED TRIMESTER: Primary | ICD-10-CM

## 2022-04-19 DIAGNOSIS — Z3A.21 21 WEEKS GESTATION OF PREGNANCY: ICD-10-CM

## 2022-04-19 DIAGNOSIS — Z67.91 RH NEGATIVE STATE IN ANTEPARTUM PERIOD: ICD-10-CM

## 2022-04-19 DIAGNOSIS — O44.02 PLACENTA PREVIA IN SECOND TRIMESTER: ICD-10-CM

## 2022-04-19 PROCEDURE — 0502F SUBSEQUENT PRENATAL CARE: CPT | Performed by: ADVANCED PRACTICE MIDWIFE

## 2022-04-19 NOTE — PROGRESS NOTES
Pt is here today at her 21w5d  Pt states fetal movement is present  Pt has questions regarding the anatomy scan yesterday/low placenta for delivery   Pt has concerns about being nauseated and tired    Instructions regarding upcoming glucose test    Questions regarding labor

## 2022-05-13 PROBLEM — O44.02 PLACENTA PREVIA IN SECOND TRIMESTER: Status: RESOLVED | Noted: 2022-04-19 | Resolved: 2022-05-13

## 2022-05-14 NOTE — PROGRESS NOTES
SUBJECTIVE:  Chantal Ace is here for her return OB visit. She is reporting issues with back pain but otherwise doing well. Has questions moving forward  She reports fetal movement. She denies vaginal bleeding. She denies vaginal discharge. She denies leaking of fluid. She denies uterine contraction activity. She denies nausea and/or vomiting. She denies retaining fluid in her extremities. OBJECTIVE:  Blood pressure 126/82, pulse 103, weight 184 lb 14.4 oz (83.9 kg), last menstrual period 11/18/2021, not currently breastfeeding. Chantal Ace has not the Tdap vaccine as appropriate    [place] score: 1      ASSESSMENT/PLAN:  1. 25 weeks gestation of pregnancy  S=D    2. High risk multigravida in second trimester  The problem list was reviewed and updated with any new issues from today's visit    Chantal Ace will monitor fetal movement daily. 28 week lab panel was discussed and was done today  RhoGam was discussed and was ordered. Initial discussion on birth plan was done. 2nd trimester packet give  Discussed back pain, to consider Pregnancy Class and/or PT as needed  - Glucose tolerance, 1 hour; Future  - CBC with Auto Differential; Future    3. Rh negative state in antepartum period  - Received today  - rho(D) immune globulin (HYPERRHO S/D) injection 300 mcg    4. Placenta previa in second trimester  - Reports no bleeding episodes and has follow up [address] scheduled    Chantal Ace was counseled regarding all of the above    The patient, Kb Vargas,  was seen with a total time spent of 20 minutes for the visit on this date of service by the [facility]  The time component, involved both face-to-face (counseling and education)  and non face-to-face time (care coordination), spent in determining the total time component.

## 2022-05-17 ENCOUNTER — HOSPITAL ENCOUNTER (OUTPATIENT)
Age: 32
Setting detail: SPECIMEN
Discharge: HOME | End: 2022-05-17

## 2022-05-17 ENCOUNTER — ROUTINE PRENATAL (OUTPATIENT)
Dept: OBGYN CLINIC | Age: 32
End: 2022-05-17

## 2022-05-17 VITALS
WEIGHT: 184.9 LBS | BODY MASS INDEX: 29.84 KG/M2 | HEART RATE: 103 BPM | SYSTOLIC BLOOD PRESSURE: 126 MMHG | DIASTOLIC BLOOD PRESSURE: 82 MMHG

## 2022-05-17 DIAGNOSIS — Z67.91 RH NEGATIVE STATE IN ANTEPARTUM PERIOD: ICD-10-CM

## 2022-05-17 DIAGNOSIS — O09.42 HIGH RISK MULTIGRAVIDA IN SECOND TRIMESTER: Primary | ICD-10-CM

## 2022-05-17 DIAGNOSIS — O26.899 RH NEGATIVE STATE IN ANTEPARTUM PERIOD: ICD-10-CM

## 2022-05-17 DIAGNOSIS — O44.02 PLACENTA PREVIA IN SECOND TRIMESTER: ICD-10-CM

## 2022-05-17 DIAGNOSIS — Z3A.25 25 WEEKS GESTATION OF PREGNANCY: ICD-10-CM

## 2022-05-17 PROCEDURE — 0502F SUBSEQUENT PRENATAL CARE: CPT | Performed by: ADVANCED PRACTICE MIDWIFE

## 2022-05-18 DIAGNOSIS — Z3A.25 25 WEEKS GESTATION OF PREGNANCY: ICD-10-CM

## 2022-05-18 DIAGNOSIS — O09.90 HRP (HIGH RISK PREGNANCY), UNSPECIFIED TRIMESTER: ICD-10-CM

## 2022-05-18 DIAGNOSIS — O99.810 ABNORMAL GLUCOSE TOLERANCE TEST IN PREGNANCY: Primary | ICD-10-CM

## 2022-05-18 LAB
ABSOLUTE EOS #: 0.07 K/UL (ref 0–0.44)
ABSOLUTE IMMATURE GRANULOCYTE: 0.05 K/UL (ref 0–0.3)
ABSOLUTE LYMPH #: 1.73 K/UL (ref 1.1–3.7)
ABSOLUTE MONO #: 0.47 K/UL (ref 0.1–1.2)
BASOPHILS # BLD: 0 % (ref 0–2)
BASOPHILS ABSOLUTE: 0.03 K/UL (ref 0–0.2)
EOSINOPHILS RELATIVE PERCENT: 1 % (ref 1–4)
GLUCOSE ADMINISTRATION: ABNORMAL
GLUCOSE TOLERANCE SCREEN 50G: 174 MG/DL (ref 70–135)
HCT VFR BLD CALC: 36.3 % (ref 36.3–47.1)
HEMOGLOBIN: 11.7 G/DL (ref 11.9–15.1)
HIV AG/AB: NONREACTIVE
IMMATURE GRANULOCYTES: 1 %
LYMPHOCYTES # BLD: 20 % (ref 24–43)
MCH RBC QN AUTO: 32.1 PG (ref 25.2–33.5)
MCHC RBC AUTO-ENTMCNC: 32.2 G/DL (ref 28.4–34.8)
MCV RBC AUTO: 99.5 FL (ref 82.6–102.9)
MONOCYTES # BLD: 6 % (ref 3–12)
NRBC AUTOMATED: 0 PER 100 WBC
PDW BLD-RTO: 13.5 % (ref 11.8–14.4)
PLATELET # BLD: 161 K/UL (ref 138–453)
PMV BLD AUTO: 11.9 FL (ref 8.1–13.5)
RBC # BLD: 3.65 M/UL (ref 3.95–5.11)
SEG NEUTROPHILS: 72 % (ref 36–65)
SEGMENTED NEUTROPHILS ABSOLUTE COUNT: 6.16 K/UL (ref 1.5–8.1)
T. PALLIDUM, IGG: NONREACTIVE
WBC # BLD: 8.5 K/UL (ref 3.5–11.3)

## 2022-05-18 PROCEDURE — 96372 THER/PROPH/DIAG INJ SC/IM: CPT | Performed by: ADVANCED PRACTICE MIDWIFE

## 2022-05-20 ENCOUNTER — HOSPITAL ENCOUNTER (OUTPATIENT)
Age: 32
Setting detail: SPECIMEN
Discharge: HOME | End: 2022-05-20

## 2022-05-20 DIAGNOSIS — Z3A.25 25 WEEKS GESTATION OF PREGNANCY: ICD-10-CM

## 2022-05-20 DIAGNOSIS — O09.90 HRP (HIGH RISK PREGNANCY), UNSPECIFIED TRIMESTER: ICD-10-CM

## 2022-05-20 DIAGNOSIS — O99.810 ABNORMAL GLUCOSE TOLERANCE TEST IN PREGNANCY: ICD-10-CM

## 2022-05-20 LAB
3 HR GLUCOSE: 114 MG/DL (ref 65–139)
AMOUNT GLUCOSE GIVEN: 100 G
GLUCOSE FASTING: 74 MG/DL (ref 65–94)
GLUCOSE TOLERANCE TEST 1 HOUR: 152 MG/DL (ref 65–179)
GLUCOSE TOLERANCE TEST 2 HOUR: 129 MG/DL (ref 65–154)

## 2022-06-15 ENCOUNTER — ROUTINE PRENATAL (OUTPATIENT)
Dept: OBGYN CLINIC | Age: 32
End: 2022-06-15

## 2022-06-15 VITALS
BODY MASS INDEX: 29.86 KG/M2 | HEART RATE: 79 BPM | DIASTOLIC BLOOD PRESSURE: 62 MMHG | WEIGHT: 185 LBS | SYSTOLIC BLOOD PRESSURE: 112 MMHG

## 2022-06-15 DIAGNOSIS — O26.899 RH NEGATIVE STATE IN ANTEPARTUM PERIOD: ICD-10-CM

## 2022-06-15 DIAGNOSIS — O09.90 HRP (HIGH RISK PREGNANCY), UNSPECIFIED TRIMESTER: ICD-10-CM

## 2022-06-15 DIAGNOSIS — Z67.91 RH NEGATIVE STATE IN ANTEPARTUM PERIOD: ICD-10-CM

## 2022-06-15 DIAGNOSIS — Z3A.29 29 WEEKS GESTATION OF PREGNANCY: Primary | ICD-10-CM

## 2022-06-15 PROCEDURE — 99213 OFFICE O/P EST LOW 20 MIN: CPT | Performed by: ADVANCED PRACTICE MIDWIFE

## 2022-06-15 NOTE — PROGRESS NOTES
Pt is here today at her 29w6  Pt states fetal movement is present  Pt has questions about labor and delivery.   Pt declines TDAP

## 2022-06-15 NOTE — PROGRESS NOTES
SUBJECTIVE:  Zoey Stanford is here for her return OB visit. She reports fetal movement. She denies  vaginal bleeding. She denies  vaginal discharge. She denies leaking of fluid. She denies uterine contraction activity. She denies nausea and/or vomiting. She denies retaining fluid in her extremities. OBJECTIVE:  Blood pressure 112/62, pulse 79, weight 185 lb (83.9 kg), last menstrual period 2021, not currently breastfeeding. Zoey Stanford has not received the flu vaccine as appropriate  Zoey Stanford has not received the Tdap vaccine as appropriate    She has RhoGam as indicated      ASSESSMENT/PLAN:  1. Rh negative state in antepartum period      2. HRP (high risk pregnancy), unspecified trimester      3. 29 weeks gestation of pregnancy        The problem list was reviewed and updated with any new issues from today's visit  After reviewing and updating the problem list, the chart was sent to Dr Keiry Severino  for review    Zoey Stanford will monitor fetal movement daily. 28 week lab results were reviewed. Fetal Kick Count was discussed and explained. [place] contractions vs  labor contractions were reviewed. Signs and symptoms of Pre-Eclampsia were were reviewed and discussed  Initial discussion regarding birth plans was begun    Zoey Stanford was counseled regarding all of the above    The patient, Belinda Box,  was seen with a total time spent of 20 minutes for the visit on this date of service by the [facility]  The time component, involved both face-to-face (counseling and education)  and non face-to-face time (care coordination), spent in determining the total time component.

## 2022-06-17 ENCOUNTER — ROUTINE PRENATAL (OUTPATIENT)
Dept: PERINATAL CARE | Age: 32
End: 2022-06-17

## 2022-06-17 VITALS
HEIGHT: 66 IN | DIASTOLIC BLOOD PRESSURE: 70 MMHG | WEIGHT: 185 LBS | SYSTOLIC BLOOD PRESSURE: 123 MMHG | HEART RATE: 95 BPM | BODY MASS INDEX: 29.73 KG/M2 | TEMPERATURE: 97.3 F | RESPIRATION RATE: 16 BRPM

## 2022-06-17 DIAGNOSIS — O44.00 PLACENTA PREVIA WITHOUT HEMORRHAGE, ANTEPARTUM: Primary | ICD-10-CM

## 2022-06-17 DIAGNOSIS — Z3A.30 30 WEEKS GESTATION OF PREGNANCY: ICD-10-CM

## 2022-06-17 DIAGNOSIS — O99.810 ABNORMAL MATERNAL GLUCOSE TOLERANCE, ANTEPARTUM: ICD-10-CM

## 2022-06-17 DIAGNOSIS — Z13.89 ENCOUNTER FOR ROUTINE SCREENING FOR MALFORMATION USING ULTRASONICS: ICD-10-CM

## 2022-06-17 DIAGNOSIS — Z03.72 SUSPECTED PLACENTAL PROBLEM NOT FOUND: ICD-10-CM

## 2022-06-17 DIAGNOSIS — Z36.4 ANTENATAL SCREENING FOR FETAL GROWTH RETARDATION USING ULTRASONICS: ICD-10-CM

## 2022-06-17 PROCEDURE — 76819 FETAL BIOPHYS PROFIL W/O NST: CPT | Performed by: OBSTETRICS & GYNECOLOGY

## 2022-06-17 PROCEDURE — 76817 TRANSVAGINAL US OBSTETRIC: CPT | Performed by: OBSTETRICS & GYNECOLOGY

## 2022-06-17 PROCEDURE — 76805 OB US >/= 14 WKS SNGL FETUS: CPT | Performed by: OBSTETRICS & GYNECOLOGY

## 2022-06-17 PROCEDURE — 99999 PR OFFICE/OUTPT VISIT,PROCEDURE ONLY: CPT | Performed by: OBSTETRICS & GYNECOLOGY

## 2022-06-17 NOTE — PATIENT INSTRUCTIONS
Patient advised to follow up with referring provider. Pt to ED with reported abdominal, L axilla and L breast abscess x3 days.

## 2022-06-19 ENCOUNTER — CLINICAL DOCUMENTATION (OUTPATIENT)
Dept: OBGYN CLINIC | Age: 32
End: 2022-06-19

## 2022-06-19 DIAGNOSIS — O09.90 HRP (HIGH RISK PREGNANCY), UNSPECIFIED TRIMESTER: ICD-10-CM

## 2022-07-01 ENCOUNTER — ROUTINE PRENATAL (OUTPATIENT)
Dept: OBGYN CLINIC | Age: 32
End: 2022-07-01

## 2022-07-01 VITALS
DIASTOLIC BLOOD PRESSURE: 69 MMHG | SYSTOLIC BLOOD PRESSURE: 118 MMHG | HEART RATE: 99 BPM | BODY MASS INDEX: 30.17 KG/M2 | WEIGHT: 186.9 LBS

## 2022-07-01 DIAGNOSIS — O09.90 HRP (HIGH RISK PREGNANCY), UNSPECIFIED TRIMESTER: Primary | ICD-10-CM

## 2022-07-01 DIAGNOSIS — Z3A.32 32 WEEKS GESTATION OF PREGNANCY: ICD-10-CM

## 2022-07-01 DIAGNOSIS — O26.899 RH NEGATIVE STATE IN ANTEPARTUM PERIOD: ICD-10-CM

## 2022-07-01 DIAGNOSIS — O99.810 ABNORMAL GLUCOSE TOLERANCE TEST IN PREGNANCY: ICD-10-CM

## 2022-07-01 DIAGNOSIS — Z67.91 RH NEGATIVE STATE IN ANTEPARTUM PERIOD: ICD-10-CM

## 2022-07-01 PROCEDURE — 99212 OFFICE O/P EST SF 10 MIN: CPT

## 2022-07-01 NOTE — PROGRESS NOTES
Pt is here today at her 32w1d  Pt states fetal movement is present  Pt has questions regarding her belly feel very tight    Small likes in todays urine
time (care coordination), were spent in determining the total time component.      Electronically Signed By: ERVIN Morejon CNM

## 2022-07-13 NOTE — PROGRESS NOTES
SUBJECTIVE:    Hang Pereira is here for her return OB visit. Reports for the last week feeling office with excessive fatigue some mild nausea that has not resolved some constipation that she has taken a laxative for with relief. No current concerns. Has some questions regarding labor and delivery plan  She reports  feeling fetal movement. She denies vaginal bleeding. She denies vaginal discharge. She denies leaking of fluid. She denies uterine cramping. She denies  nausea and/or vomiting. OBJECTIVE:  Blood pressure 124/75, pulse 91, weight 187 lb (84.8 kg), last menstrual period 2021, not currently breastfeeding. Total weight gain: 12 lb (5.443 kg)    Hang Pereira has not received the Tdap vaccine as appropriate  Hang Pereira has not received the COVID vaccine as appropriate    ASSESSMENT/PLAN:  IUP @ 34+1 weeks  S =D    Normal Repeat Pregnancy  Due date is based on LMP  Patient's prenatal labs are completed. Patient's blood type A negative and rhogam is indicated in pregnancy. Had rhogam 22  Early glucola indicated: no  Patient declined genetic screening. Anatomy scan completed. Placenta anterior,normal cord insertion: Yes, cervical length 40-42mm, no low lying, no previa noted (previa noted 22 RESOLVED 22). Follow up as clinically indicated. Glucola between 24-28 weeks. complete 174 Fail 78/152/129/117 (pass)  Fetal Kick Count was discussed and explained. She was instructed to lay on her left side 20 minutes after eating and count movements for up to 2 hours with a target value of 10 movements. Instructed to notify office if she does not make the target. GBS discussed   Reviewed signs and symptoms of  labor: yes  Reviewed signs and symptoms of labor: NA  Reviewed signs and symptoms of preeclampsia: yes  Reviewed signs and symptoms of jason hick contractions: yes  Reviewed birth preferences: Yes. Discussed indepth patients birth preferences.  Reviewed in depth pain management options including hydrotherapy, IV medications, nitrous oxide and epidural. Patient previously had an epidural and did not feel like it made anything easier. She could not rest with it. There was changes to the baby's heart rate that was concerns. Would like to labor at home at long as possible and reduce unnecessary interventions    2. 33 weeks gestation of pregnancy    3. Rh negative state in antepartum period  - Received    4. Abnormal glucose tolerance test in pregnancy  - 3 hour pass    5. Placenta previa in second trimester- RESOLVED      She was counseled regarding all of the above:    Return for Return OB, GBS.     The patient, Lisandro Gastelum,  was seen with a total time spent of 25 minutes for the visit on this date of service by the [facility]  On this date July 15, 2022,  I have spent greater than 50% of this visit:  [x] reviewing previous notes  [x] reviewing test results  [x] pre-charting  Discussed with patient:  [x] Importance of compliance with treatment plan  [x] Counseling  [x] Coordinating care  [x] Documenting     Electronically Signed By: Akira Vyas

## 2022-07-15 ENCOUNTER — ROUTINE PRENATAL (OUTPATIENT)
Dept: OBGYN CLINIC | Age: 32
End: 2022-07-15

## 2022-07-15 VITALS
WEIGHT: 187 LBS | DIASTOLIC BLOOD PRESSURE: 75 MMHG | HEART RATE: 91 BPM | BODY MASS INDEX: 30.18 KG/M2 | SYSTOLIC BLOOD PRESSURE: 124 MMHG

## 2022-07-15 DIAGNOSIS — O99.810 ABNORMAL GLUCOSE TOLERANCE TEST IN PREGNANCY: ICD-10-CM

## 2022-07-15 DIAGNOSIS — Z34.90 NORMAL REPEAT PREGNANCY, ANTEPARTUM: Primary | ICD-10-CM

## 2022-07-15 DIAGNOSIS — O26.899 RH NEGATIVE STATE IN ANTEPARTUM PERIOD: ICD-10-CM

## 2022-07-15 DIAGNOSIS — O44.02 PLACENTA PREVIA IN SECOND TRIMESTER: ICD-10-CM

## 2022-07-15 DIAGNOSIS — Z67.91 RH NEGATIVE STATE IN ANTEPARTUM PERIOD: ICD-10-CM

## 2022-07-15 DIAGNOSIS — Z3A.33 33 WEEKS GESTATION OF PREGNANCY: ICD-10-CM

## 2022-07-15 PROBLEM — Z28.21 COVID-19 VACCINATION DECLINED: Status: ACTIVE | Noted: 2022-07-15

## 2022-07-15 PROBLEM — Z28.21 TETANUS, DIPHTHERIA, AND ACELLULAR PERTUSSIS (TDAP) VACCINATION DECLINED: Status: ACTIVE | Noted: 2022-07-15

## 2022-07-15 PROCEDURE — 0502F SUBSEQUENT PRENATAL CARE: CPT | Performed by: ADVANCED PRACTICE MIDWIFE

## 2022-07-29 ENCOUNTER — HOSPITAL ENCOUNTER (OUTPATIENT)
Age: 32
Setting detail: SPECIMEN
Discharge: HOME | End: 2022-07-29

## 2022-07-29 ENCOUNTER — ROUTINE PRENATAL (OUTPATIENT)
Dept: OBGYN CLINIC | Age: 32
End: 2022-07-29

## 2022-07-29 VITALS
WEIGHT: 188.2 LBS | BODY MASS INDEX: 30.38 KG/M2 | HEART RATE: 92 BPM | DIASTOLIC BLOOD PRESSURE: 70 MMHG | SYSTOLIC BLOOD PRESSURE: 112 MMHG

## 2022-07-29 DIAGNOSIS — Z3A.36 36 WEEKS GESTATION OF PREGNANCY: ICD-10-CM

## 2022-07-29 DIAGNOSIS — O09.43 HIGH RISK MULTIGRAVIDA IN THIRD TRIMESTER: ICD-10-CM

## 2022-07-29 DIAGNOSIS — O09.43 HIGH RISK MULTIGRAVIDA IN THIRD TRIMESTER: Primary | ICD-10-CM

## 2022-07-29 PROCEDURE — 99212 OFFICE O/P EST SF 10 MIN: CPT | Performed by: ADVANCED PRACTICE MIDWIFE

## 2022-08-01 PROBLEM — B95.1 POSITIVE GBS TEST: Status: ACTIVE | Noted: 2022-08-01

## 2022-08-01 LAB
CULTURE: ABNORMAL
SPECIMEN DESCRIPTION: ABNORMAL

## 2022-08-05 ENCOUNTER — ROUTINE PRENATAL (OUTPATIENT)
Dept: OBGYN CLINIC | Age: 32
End: 2022-08-05

## 2022-08-05 VITALS
WEIGHT: 191 LBS | BODY MASS INDEX: 30.83 KG/M2 | HEART RATE: 76 BPM | DIASTOLIC BLOOD PRESSURE: 75 MMHG | SYSTOLIC BLOOD PRESSURE: 111 MMHG

## 2022-08-05 DIAGNOSIS — B95.1 POSITIVE GBS TEST: ICD-10-CM

## 2022-08-05 DIAGNOSIS — O26.899 RH NEGATIVE STATE IN ANTEPARTUM PERIOD: ICD-10-CM

## 2022-08-05 DIAGNOSIS — O09.90 HRP (HIGH RISK PREGNANCY), UNSPECIFIED TRIMESTER: ICD-10-CM

## 2022-08-05 DIAGNOSIS — O09.43 HIGH RISK MULTIGRAVIDA IN THIRD TRIMESTER: ICD-10-CM

## 2022-08-05 DIAGNOSIS — Z3A.37 37 WEEKS GESTATION OF PREGNANCY: Primary | ICD-10-CM

## 2022-08-05 DIAGNOSIS — O99.810 ABNORMAL GLUCOSE TOLERANCE TEST IN PREGNANCY: ICD-10-CM

## 2022-08-05 DIAGNOSIS — Z67.91 RH NEGATIVE STATE IN ANTEPARTUM PERIOD: ICD-10-CM

## 2022-08-05 PROCEDURE — 99214 OFFICE O/P EST MOD 30 MIN: CPT | Performed by: ADVANCED PRACTICE MIDWIFE

## 2022-08-05 NOTE — PROGRESS NOTES
[facility] AND GYNECOLOG[facility]  [address] [place] [address] 5614.   [address] [address]  Dept: [phone]      Patient Name: Junius Libman  Patient : 1990  MRN #: [id number]  [facility] #: [id number]    Date: 2022  Time: 8:40 AM  Chief Complaint   Patient presents with    Routine Prenatal Visit     37w1      Patient's last menstrual period was 2021. SUBJECTIVE:    Jumana Johnston is here for her return OB visit. She is 37w0d weeks pregnant. She reports  feeling fetal movement. She denies vaginal bleeding, vaginal discharge, leaking of fluid. She reports uterine cramping. She denies  nausea and/or vomiting. She denies HA, visual changes, edema, or RUQ pain. Questions about s/s labor and GBS treatment    OB History    Para Term  AB Living   2 1 1     1   SAB IAB Ectopic Molar Multiple Live Births             1      # Outcome Date GA Lbr Ottoniel/2nd Weight Sex Delivery Anes PTL Lv   2 Current            1 Term 03/24/15 41w2d  7 lb 14 oz (3.572 kg) F Vag-Spont  N ARABELLA     No past medical history on file. No past surgical history on file. Current Outpatient Medications   Medication Sig Dispense Refill    Prenatal Vit-Fe Fumarate-FA (PRENATAL VITAMIN) 27-0.8 MG TABS Take 1 tablet by mouth daily       No current facility-administered medications for this visit. Allergies   Allergen Reactions    Seasonal        OBJECTIVE:  /75   Pulse 76   Wt 191 lb (86.6 kg)   LMP 2021   BMI 30.83 kg/m²   Wt Readings from Last 3 Encounters:   22 191 lb (86.6 kg)   22 188 lb 3.2 oz (85.4 kg)   07/15/22 187 lb (84.8 kg)     Body mass index is 30.83 kg/m². Total weight gain: 16 lb (7.258 kg)    Jumana Johnston has not received the flu vaccine as appropriate.    Jumana Johnston has not received the Tdap vaccine as appropriate  Jumana Johnston has not received the COVID vaccine as appropriate    ASSESSMENT/PLAN:  IUP 37w0d weeks    Pregnancy  Due date is based on LMP   Patient's prenatal labs are were completed. Patient's A- and rhogam is not indicated in pregnancy. Gc/ct- not done  urine culture- negative 22  UDS -  negative  Hep B- NR  Hep C- declined  HIV- NR  H/H/P: 13.0/39.8/197  Rubella- immune  T. Pallidum, IgG- NR  Varicella- declined  Early GTT-  TSH-  Patient declined genetic screening. Anatomy scan WNL. Placenta anterior, normal cord insertion, cervical length 42mm, a complete placenta previa was noted. Follow up 28 week. Glucola 174 between 24-28 weeks. H/H/P- 11.7/36.3/161  T.  Pallidium - NR  HIV-NR  GBS- positive 22        MMW patient  GBS positive - PCN G in labor  Self pay - minimal labs  Pap PP     Patient Active Problem List    Diagnosis Date Noted    Abnormal glucose tolerance test in pregnancy 2022     Priority: High     Overview Note:     Glucola:174, need 3 hr GTT: (WNL) 74/152/129/114      Rh negative state in antepartum period 2022     Priority: High     Overview Note:     RhoGam @ 28 weeks: had Rhogam 22      HRP (high risk pregnancy), unspecified trimester 2022     Priority: High     Overview Note:     Genetic screening: DECLINES  AFP: DECLINES  CF/SMA/FragileX: DECLINES  Chart Review DR Mis Cervantes: Dione Wu MD 22           COVID-19 vaccination declined 07/15/2022     Priority: Medium    Tetanus, diphtheria, and acellular pertussis (Tdap) vaccination declined 07/15/2022     Priority: Medium    Self-Pay 2022     Priority: Medium     Overview Note:     Ronen Natalia labs  States anatomy scan w/MFM will be covered by [facility]- referral placed 2022       Positive GBS test 2022     Overview Note:     PCN G in labor      Placenta previa in second trimester- RESOLVED 2022     Overview Note:     22 EFW 1 lb, complete previa, AFV normal, normal fetal anatomy, CL normal  Precautions reviewed 2022   F/U at 28 weeks: 22 EFW 47% (AC 27%), MARGARET 14.78, previa RESOLVED- F/U with MFM PRN       COVID-19 affecting pregnancy in first trimester 02/18/2022     Overview Note:     Sx 2/5/22  Positive test at home 2/10/22  No residual symptoms       Family history of colon cancer 07/19/2021     Overview Note:     (1/7/22) Discussed genetic screening and will check with insurance        Questions answered about gbs treatment    Recommend pt to start 6 fresh dates per day and red raspberry leaf tea to hopefully go into labor on her own before 42 weeks. She would like to avoid iol with this baby. Declined SVE today    Pt was educated on s/s labor and FM monitoring. Pt was instructed to call your provider if:    You have any signs or symptoms that are not normal.  You are thinking of taking any new medicines, vitamins, or herbs. You have any bleeding. You have increased vaginal discharge with odor. You have a fever, chills, or pain when passing urine. You have headaches. You have changes or blind spots in your eyesight. Your water breaks. You start having regular, painful contractions q5 min, lasting 1 hr  You notice a decrease in fetal movement. You have significant swelling and weight gain. You have chest pain or difficulty breathing. Post-term and post date testing discussed   Education    The following were addressed during this visit:    39 Weeks  - Postpartum Care   - Late Pregnancy Signs and Symptoms   - Group B Strep Test     38-41 Weeks  - Post-Term Management        She was counseled regarding all of the above:    Return in about 1 week (around 8/12/2022). The patient, Jeffery Umaña was seen with a total time spent of 15 minutes for the visit on this date of service by the [facility]  Both face-to-face (counseling and education) and non face-to-face time (care coordination), were spent in determining the total time component.      Electronically Signed By: ERVIN Pruitt CNM

## 2022-08-05 NOTE — PROGRESS NOTES
Pt is here today at her 37w1 prenatal visit  Pt states fetal movement is present  Pt has no questions or concerns today

## 2022-08-11 NOTE — PROGRESS NOTES
SUBJECTIVE:    Michoacano Shpepard is here for her routine OB visit. She is doing well overall, waiting  Hs noticed some stronger contractions recently. Reports she is unsure if she is leaking has noticed more increase in moisture, feeling wetter than usual  She reports  fetal movement. She denies  vaginal bleeding. She denies  vaginal discharge. She denies  uterine contraction activity. She denies  nausea and/or vomiting. She denies retaining fluid in her extremities. OBJECTIVE:   Blood pressure 104/68, pulse 89, weight 194 lb (88 kg), last menstrual period 11/18/2021, not currently breastfeeding. SSE:  No fluid noted at introitus            Vagina pink with moderate amount of yellowish discharge             Cervix is easily visualized and appears closed. No fluid with Valsalva x 2 or bearing down. Nitrazine is negative and Ferning negative as well    SVE: Mid to posterior/softening/ 1 cm/thick/-2 st                               0             1          2         3         Patient  Dilation            Closed      1-2      3-4       5-6         1  Effacement       0-30       40-50   60-70    >80        0  Station             -3              -2       -1/0      +1/+2      1  Consistency     Firm        Med     Soft                    1  Position            Post        Mid       Ant                    0  TOTAL                                                                    3       ASSESSMENT/PLAN:  1. 38 weeks gestation of pregnancy  S=D    2. High risk multigravida in third trimester  The problem list was reviewed and updated as needed. Michoacano Sheppard will monitor for fetal movements daily    GBS protocol and testing was discussed. GBS culture is positive  Signs of labor to report were discussed. Birth preferences were discussed with no updates  Discussed sweeping of membranes for NOV.     3. Encounter for suspected premature rupture of amniotic membranes, with rupture of membranes not found  Nitrazine negative as

## 2022-08-12 ENCOUNTER — ROUTINE PRENATAL (OUTPATIENT)
Dept: OBGYN CLINIC | Age: 32
End: 2022-08-12

## 2022-08-12 VITALS
WEIGHT: 194 LBS | HEART RATE: 89 BPM | BODY MASS INDEX: 31.31 KG/M2 | DIASTOLIC BLOOD PRESSURE: 68 MMHG | SYSTOLIC BLOOD PRESSURE: 104 MMHG

## 2022-08-12 DIAGNOSIS — O09.43 HIGH RISK MULTIGRAVIDA IN THIRD TRIMESTER: Primary | ICD-10-CM

## 2022-08-12 DIAGNOSIS — Z03.71 ENCOUNTER FOR SUSPECTED PREMATURE RUPTURE OF AMNIOTIC MEMBRANES, WITH RUPTURE OF MEMBRANES NOT FOUND: ICD-10-CM

## 2022-08-12 DIAGNOSIS — Z3A.38 38 WEEKS GESTATION OF PREGNANCY: ICD-10-CM

## 2022-08-12 PROCEDURE — 99213 OFFICE O/P EST LOW 20 MIN: CPT | Performed by: ADVANCED PRACTICE MIDWIFE

## 2022-08-12 NOTE — PROGRESS NOTES
Pt is here today at her 38w1 prenatal visit  Pt states fetal movement is present  Pt has concerns about possible leaking of fluid pt 24 weeks pregnant () presenting with palpitations and sob for 2 hrs. denies abd pain. denies fevers, chills, or cough. no exposure to covid  Hx SVT, ST

## 2022-08-18 ENCOUNTER — ROUTINE PRENATAL (OUTPATIENT)
Dept: OBGYN CLINIC | Age: 32
End: 2022-08-18

## 2022-08-18 VITALS
DIASTOLIC BLOOD PRESSURE: 68 MMHG | SYSTOLIC BLOOD PRESSURE: 104 MMHG | HEART RATE: 97 BPM | BODY MASS INDEX: 31.57 KG/M2 | WEIGHT: 195.6 LBS

## 2022-08-18 DIAGNOSIS — Z3A.39 39 WEEKS GESTATION OF PREGNANCY: ICD-10-CM

## 2022-08-18 DIAGNOSIS — Z34.93 NORMAL PREGNANCY, THIRD TRIMESTER: Primary | ICD-10-CM

## 2022-08-18 PROCEDURE — 99213 OFFICE O/P EST LOW 20 MIN: CPT | Performed by: ADVANCED PRACTICE MIDWIFE

## 2022-08-18 NOTE — PROGRESS NOTES
Pt is here today at her 39w0d  Pt states fetal movement is present  Pt has questions regarding after a membrane sweep

## 2022-08-18 NOTE — PROGRESS NOTES
SUBJECTIVE:  Yvonne Dill is here for her routine OB visit. She reports fetal movement. She denies vaginal bleeding. She denies leaking of fluid. She denies vaginal discharge. She denies regular/strong uterine contraction activity. She denies nausea and/or vomiting. She denies retaining fluid in her extremities. She denies headache, vision changes, RUQ pain, epigastric pain, and swelling. Yvonne Dill reports she is doing okay but ready for labor. Eating dates, active, etc.   Would like membranes swept today. OBJECTIVE:   Blood pressure 104/68, pulse 97, weight 195 lb 9.6 oz (88.7 kg), last menstrual period 2021, not currently breastfeeding. Pregravid BMI: 28.26   TW lb 9.6 oz (9.344 kg)   SVE: 1/thick/-3 mid, soft - membranes swept with consent     0 1 2 3 Patient    Dilation Closed 1-2 3-4 5-6 1    Effacement 0-30 40-50 60-70 >80 0    Station -3 -2 -1/0 +1/+2 0    Consistency Firm Med Soft  2    Position Post Mid Ant  1   TOTAL        4        Yvonne Dill has not had the Tdap vaccine as appropriate, declined   Yvonne Dill has not had the COVID-19 vaccine, declined   Rhogam was given 22    ASSESSMENT/PLAN:  IUP @ 39w0d  Yvonne Dill will monitor fetal movement daily. 28 week lab results were reviewed. Glucola passed 3 hour, Hgb 11.7. Fetal Kick Counts reinforced  GBS positive - PCN G in labor  Signs and symptoms of Pre-Eclampsia were not reviewed and discussed. Signs of labor to report were discussed. Birth preferences were discussed. Post-dates testing and protocol were not discussed  Yvonne Dill was not counseled regarding Post Partum Depression. Reviewed how to contact midwives and encouraged to review route to the hospital.   Questions answered about dates, visitor policy, expectations after sweep, signs of labor, fetal positioning. S=D  Normal pregnancy, third trimester  22 EFW 47% (AC 27%), MARGARET 14.78, previa RESOLVED- F/U with MFM PRN   Chart has been reviewed by collaborating physician. The problem list was reviewed and updated with any new issues from today's visit. Return in about 1 week (around 8/25/2022) for OB visit with Midwives. The patient, Camille Lyons, was seen with a total time spent of 25 minutes for the visit on this date of service by the [facility]  The time component involved both face-to-face (counseling and education) and non face-to-face time (care coordination), spent in determining the total time component.       Electronically signed by: ERVIN Dykes CNM

## 2022-08-24 ENCOUNTER — ROUTINE PRENATAL (OUTPATIENT)
Dept: OBGYN CLINIC | Age: 32
End: 2022-08-24

## 2022-08-24 VITALS
BODY MASS INDEX: 31.62 KG/M2 | DIASTOLIC BLOOD PRESSURE: 70 MMHG | HEART RATE: 81 BPM | SYSTOLIC BLOOD PRESSURE: 110 MMHG | WEIGHT: 195.9 LBS

## 2022-08-24 DIAGNOSIS — Z67.91 RH NEGATIVE STATE IN ANTEPARTUM PERIOD: Primary | ICD-10-CM

## 2022-08-24 DIAGNOSIS — Z3A.39 39 WEEKS GESTATION OF PREGNANCY: ICD-10-CM

## 2022-08-24 DIAGNOSIS — O09.90 HRP (HIGH RISK PREGNANCY), UNSPECIFIED TRIMESTER: ICD-10-CM

## 2022-08-24 DIAGNOSIS — O26.899 RH NEGATIVE STATE IN ANTEPARTUM PERIOD: Primary | ICD-10-CM

## 2022-08-24 PROCEDURE — 99213 OFFICE O/P EST LOW 20 MIN: CPT | Performed by: ADVANCED PRACTICE MIDWIFE

## 2022-08-24 NOTE — PROGRESS NOTES
SUBJECTIVE:    Marisa Marcelo is here for her routine OB visit. She reports  fetal movement. She denies  vaginal bleeding. She denies  leaking of fluid. She denies  vaginal discharge. She denies  uterine contraction activity. She denies  nausea and/or vomiting. She denies retaining fluid in her extremities. Ready for labor. OBJECTIVE:   Blood pressure 110/70, pulse 81, weight 195 lb 14.4 oz (88.9 kg), last menstrual period 11/18/2021, not currently breastfeeding. ASSESSMENT/PLAN:  1. Rh negative state in antepartum period      2. HRP (high risk pregnancy), unspecified trimester      3. Abnormal glucose tolerance test in pregnancy        The problem list was reviewed and updated as needed. Marisa Marcelo will monitor for fetal movements daily    GBS protocol and testing was discussed. GBS culture was obtained. Results were reviewed. Signs of labor to report were discussed. Birth preferences were discussed. Post-dates testing and protocol were discussed  Marisa Marcelo was counseled regarding Post Partum Depression. She has not decided on contraceptive choice. Marisa Marcelo was counseled regarding all of the above    The patient, Climmie Child,  was seen with a total time spent of 20 minutes for the visit on this date of service by the [facility]  The time component, involved both face-to-face (counseling and education)  and non face-to-face time (care coordination), spent in determining the total time component.

## 2022-08-24 NOTE — PROGRESS NOTES
Pt is here today at her 39w6d appointment  Pt states fetal movement is present  Pt would like to be checked today

## 2022-08-29 NOTE — PROGRESS NOTES
SUBJECTIVE:    Nemesio Vasquez is here for her routine OB visit. She is doing well and just waiting on labor, is open to going to 42 wk. Does state that she and  will discuss any induction prior to this time  She is unsure if she is feeling contractions  She reports  fetal movement. She denies  vaginal bleeding. She denies  leaking of fluid. She denies  vaginal discharge. She denies  nausea and/or vomiting. She denies retaining fluid in her extremities. OBJECTIVE:   Blood pressure 118/74, pulse 82, weight 196 lb (88.9 kg), last menstrual period 11/18/2021, not currently breastfeeding. BPP: 8/8 (reported)    NST:  Baseline: 130  Variability: Moderate  Accelerations: Yes  Decelerations: No  Fetal movement felt by mother: Yes  Contractions: Yes, irregular and not noticed by Nemesio Vasquez  Interpretation: REACTIVE    SVE: Posterior/softening/loose 1 cm/50%/-3           Sweeping per reques                                 0             1          2         3         Patient  Dilation            Closed      1-2      3-4       5-6         1  Effacement       0-30       40-50   60-70    >80        1  Station             -3              -2       -1/0      +1/+2      0  Consistency     Firm        Med     Soft                    1  Position            Post        Mid       Ant                    0  TOTAL                                                                    3       ASSESSMENT/PLAN:  1. 41 weeks gestation of pregnancy  Reactive NST  BPP: 8/8    2. High risk multigravida in third trimester  Declined Tdap vaccine in pregnancy  She is aware how to reach us after hours    3. Post-term pregnancy, 40-42 weeks of gestation  - Discussed she did not feel comfortable with induction at this time and advised no further than 42 wk for induction.   May consider induction pending discussion with  and will let us know  - She will let us know if desires to schedule induction on Tuesday 9/6, otherwise, she is scheduled for NST  - 06069 - [place] FETAL NON-STRESS TEST    4. Positive GBS test  - Aware will need antibiotics in labor    5. Rh negative state in antepartum period  - Has received Rodriguez Reasons was counseled regarding all of the above    The patient, Lisandro Gastelum,  was seen with a total time spent of 20 minutes for the visit on this date of service by the [facility]  The time component, involved both face-to-face (counseling and education)  and non face-to-face time (care coordination), spent in determining the total time component.

## 2022-09-01 ENCOUNTER — ROUTINE PRENATAL (OUTPATIENT)
Dept: OBGYN CLINIC | Age: 32
End: 2022-09-01

## 2022-09-01 ENCOUNTER — PROCEDURE VISIT (OUTPATIENT)
Dept: OBGYN CLINIC | Age: 32
End: 2022-09-01

## 2022-09-01 VITALS
DIASTOLIC BLOOD PRESSURE: 74 MMHG | WEIGHT: 196 LBS | SYSTOLIC BLOOD PRESSURE: 118 MMHG | HEART RATE: 82 BPM | BODY MASS INDEX: 31.64 KG/M2

## 2022-09-01 DIAGNOSIS — O48.0 41 WEEKS GESTATION OF PREGNANCY: ICD-10-CM

## 2022-09-01 DIAGNOSIS — Z3A.41 41 WEEKS GESTATION OF PREGNANCY: ICD-10-CM

## 2022-09-01 DIAGNOSIS — O48.0 POST-TERM PREGNANCY, 40-42 WEEKS OF GESTATION: ICD-10-CM

## 2022-09-01 DIAGNOSIS — O09.43 HIGH RISK MULTIGRAVIDA IN THIRD TRIMESTER: Primary | ICD-10-CM

## 2022-09-01 DIAGNOSIS — O26.899 RH NEGATIVE STATE IN ANTEPARTUM PERIOD: ICD-10-CM

## 2022-09-01 DIAGNOSIS — B95.1 POSITIVE GBS TEST: ICD-10-CM

## 2022-09-01 DIAGNOSIS — Z67.91 RH NEGATIVE STATE IN ANTEPARTUM PERIOD: ICD-10-CM

## 2022-09-01 PROCEDURE — 99213 OFFICE O/P EST LOW 20 MIN: CPT | Performed by: ADVANCED PRACTICE MIDWIFE

## 2022-09-01 PROCEDURE — 59025 FETAL NON-STRESS TEST: CPT | Performed by: ADVANCED PRACTICE MIDWIFE

## 2022-09-01 PROCEDURE — 76818 FETAL BIOPHYS PROFILE W/NST: CPT | Performed by: OBSTETRICS & GYNECOLOGY

## 2022-09-01 NOTE — PROGRESS NOTES
39 WK IUP  MARGARET: 20.45 cm  HR: 130 bpm  anterior placenta, cephalicpresentation  Active fetal movements  BPP 8/8

## 2022-09-01 NOTE — PROGRESS NOTES
Pt is here today at her 41w prenatal visit with NST/BPP  Pt states fetal movement is  Present  Pt has questions about what the next steps will be.

## 2022-09-04 ENCOUNTER — HOSPITAL ENCOUNTER (INPATIENT)
Age: 32
LOS: 2 days | Discharge: HOME | End: 2022-09-06
Attending: OBSTETRICS & GYNECOLOGY | Admitting: OBSTETRICS & GYNECOLOGY

## 2022-09-04 PROBLEM — O09.90 HIGH RISK PREGNANCY, ANTEPARTUM: Status: ACTIVE | Noted: 2022-09-04

## 2022-09-04 LAB
ABO/RH: NORMAL
AMPHETAMINE SCREEN URINE: NEGATIVE
ANTIBODY SCREEN: NEGATIVE
ARM BAND NUMBER: NORMAL
BARBITURATE SCREEN URINE: NEGATIVE
BENZODIAZEPINE SCREEN, URINE: NEGATIVE
CANNABINOID SCREEN URINE: NEGATIVE
COCAINE METABOLITE, URINE: NEGATIVE
EXPIRATION DATE: NORMAL
FENTANYL URINE: NEGATIVE
HCT VFR BLD CALC: 34.4 % (ref 36.3–47.1)
HEMOGLOBIN: 11.4 G/DL (ref 11.9–15.1)
MCH RBC QN AUTO: 29.6 PG (ref 25.2–33.5)
MCHC RBC AUTO-ENTMCNC: 33.1 G/DL (ref 28.4–34.8)
MCV RBC AUTO: 89.4 FL (ref 82.6–102.9)
METHADONE SCREEN, URINE: NEGATIVE
NRBC AUTOMATED: 0 PER 100 WBC
OPIATES, URINE: NEGATIVE
OXYCODONE SCREEN URINE: NEGATIVE
PDW BLD-RTO: 13.6 % (ref 11.8–14.4)
PHENCYCLIDINE, URINE: NEGATIVE
PLATELET # BLD: 131 K/UL (ref 138–453)
PMV BLD AUTO: 12.6 FL (ref 8.1–13.5)
RBC # BLD: 3.85 M/UL (ref 3.95–5.11)
T. PALLIDUM, IGG: NONREACTIVE
TEST INFORMATION: NORMAL
WBC # BLD: 7.8 K/UL (ref 3.5–11.3)

## 2022-09-04 PROCEDURE — 2580000003 HC RX 258: Performed by: ADVANCED PRACTICE MIDWIFE

## 2022-09-04 PROCEDURE — 86780 TREPONEMA PALLIDUM: CPT

## 2022-09-04 PROCEDURE — 1220000000 HC SEMI PRIVATE OB R&B

## 2022-09-04 PROCEDURE — 86901 BLOOD TYPING SEROLOGIC RH(D): CPT

## 2022-09-04 PROCEDURE — 85027 COMPLETE CBC AUTOMATED: CPT

## 2022-09-04 PROCEDURE — 86900 BLOOD TYPING SEROLOGIC ABO: CPT

## 2022-09-04 PROCEDURE — 96372 THER/PROPH/DIAG INJ SC/IM: CPT

## 2022-09-04 PROCEDURE — 6360000002 HC RX W HCPCS: Performed by: ADVANCED PRACTICE MIDWIFE

## 2022-09-04 PROCEDURE — 96374 THER/PROPH/DIAG INJ IV PUSH: CPT

## 2022-09-04 PROCEDURE — 6370000000 HC RX 637 (ALT 250 FOR IP): Performed by: ADVANCED PRACTICE MIDWIFE

## 2022-09-04 PROCEDURE — 86850 RBC ANTIBODY SCREEN: CPT

## 2022-09-04 PROCEDURE — 80307 DRUG TEST PRSMV CHEM ANLYZR: CPT

## 2022-09-04 RX ORDER — PROCHLORPERAZINE EDISYLATE 5 MG/ML
10 INJECTION INTRAMUSCULAR; INTRAVENOUS ONCE
Status: COMPLETED | OUTPATIENT
Start: 2022-09-04 | End: 2022-09-04

## 2022-09-04 RX ORDER — SODIUM CHLORIDE 0.9 % (FLUSH) 0.9 %
5-40 SYRINGE (ML) INJECTION EVERY 12 HOURS SCHEDULED
Status: DISCONTINUED | OUTPATIENT
Start: 2022-09-04 | End: 2022-09-05

## 2022-09-04 RX ORDER — PROCHLORPERAZINE EDISYLATE 5 MG/ML
10 INJECTION INTRAMUSCULAR; INTRAVENOUS ONCE
Status: DISCONTINUED | OUTPATIENT
Start: 2022-09-04 | End: 2022-09-04

## 2022-09-04 RX ORDER — ACETAMINOPHEN 325 MG/1
650 TABLET ORAL EVERY 4 HOURS PRN
Status: DISCONTINUED | OUTPATIENT
Start: 2022-09-04 | End: 2022-09-05

## 2022-09-04 RX ORDER — DOCUSATE SODIUM 100 MG/1
100 CAPSULE, LIQUID FILLED ORAL 2 TIMES DAILY
Status: DISCONTINUED | OUTPATIENT
Start: 2022-09-04 | End: 2022-09-05

## 2022-09-04 RX ORDER — CARBOPROST TROMETHAMINE 250 UG/ML
250 INJECTION, SOLUTION INTRAMUSCULAR PRN
Status: DISCONTINUED | OUTPATIENT
Start: 2022-09-04 | End: 2022-09-05

## 2022-09-04 RX ORDER — SODIUM CHLORIDE, SODIUM LACTATE, POTASSIUM CHLORIDE, CALCIUM CHLORIDE 600; 310; 30; 20 MG/100ML; MG/100ML; MG/100ML; MG/100ML
INJECTION, SOLUTION INTRAVENOUS CONTINUOUS
Status: DISCONTINUED | OUTPATIENT
Start: 2022-09-04 | End: 2022-09-05

## 2022-09-04 RX ORDER — MORPHINE SULFATE 10 MG/ML
10 INJECTION, SOLUTION INTRAMUSCULAR; INTRAVENOUS ONCE
Status: COMPLETED | OUTPATIENT
Start: 2022-09-04 | End: 2022-09-04

## 2022-09-04 RX ORDER — MISOPROSTOL 100 UG/1
800 TABLET ORAL PRN
Status: DISCONTINUED | OUTPATIENT
Start: 2022-09-04 | End: 2022-09-05

## 2022-09-04 RX ORDER — SODIUM CHLORIDE 0.9 % (FLUSH) 0.9 %
5-40 SYRINGE (ML) INJECTION PRN
Status: DISCONTINUED | OUTPATIENT
Start: 2022-09-04 | End: 2022-09-05

## 2022-09-04 RX ORDER — METHYLERGONOVINE MALEATE 0.2 MG/ML
200 INJECTION INTRAVENOUS PRN
Status: DISCONTINUED | OUTPATIENT
Start: 2022-09-04 | End: 2022-09-05

## 2022-09-04 RX ORDER — ONDANSETRON 2 MG/ML
4 INJECTION INTRAMUSCULAR; INTRAVENOUS EVERY 6 HOURS PRN
Status: DISCONTINUED | OUTPATIENT
Start: 2022-09-04 | End: 2022-09-05

## 2022-09-04 RX ORDER — TRANEXAMIC ACID 10 MG/ML
1000 INJECTION, SOLUTION INTRAVENOUS
Status: ACTIVE | OUTPATIENT
Start: 2022-09-04 | End: 2022-09-04

## 2022-09-04 RX ORDER — SODIUM CHLORIDE, SODIUM LACTATE, POTASSIUM CHLORIDE, AND CALCIUM CHLORIDE .6; .31; .03; .02 G/100ML; G/100ML; G/100ML; G/100ML
1000 INJECTION, SOLUTION INTRAVENOUS PRN
Status: DISCONTINUED | OUTPATIENT
Start: 2022-09-04 | End: 2022-09-05

## 2022-09-04 RX ORDER — SODIUM CHLORIDE, SODIUM LACTATE, POTASSIUM CHLORIDE, AND CALCIUM CHLORIDE .6; .31; .03; .02 G/100ML; G/100ML; G/100ML; G/100ML
500 INJECTION, SOLUTION INTRAVENOUS PRN
Status: DISCONTINUED | OUTPATIENT
Start: 2022-09-04 | End: 2022-09-05

## 2022-09-04 RX ORDER — SODIUM CHLORIDE 9 MG/ML
25 INJECTION, SOLUTION INTRAVENOUS PRN
Status: DISCONTINUED | OUTPATIENT
Start: 2022-09-04 | End: 2022-09-05

## 2022-09-04 RX ADMIN — MORPHINE SULFATE 10 MG: 10 INJECTION INTRAVENOUS at 21:06

## 2022-09-04 RX ADMIN — PROCHLORPERAZINE EDISYLATE 10 MG: 5 INJECTION INTRAMUSCULAR; INTRAVENOUS at 21:05

## 2022-09-04 RX ADMIN — ONDANSETRON 4 MG: 2 INJECTION INTRAMUSCULAR; INTRAVENOUS at 23:40

## 2022-09-04 RX ADMIN — Medication 25 MCG: at 12:26

## 2022-09-04 RX ADMIN — SODIUM CHLORIDE, POTASSIUM CHLORIDE, SODIUM LACTATE AND CALCIUM CHLORIDE: 600; 310; 30; 20 INJECTION, SOLUTION INTRAVENOUS at 12:24

## 2022-09-04 RX ADMIN — Medication 1 MILLI-UNITS/MIN: at 16:31

## 2022-09-04 RX ADMIN — DEXTROSE MONOHYDRATE 5 MILLION UNITS: 5 INJECTION INTRAVENOUS at 12:24

## 2022-09-04 RX ADMIN — Medication 2.5 MILLION UNITS: at 20:33

## 2022-09-04 RX ADMIN — Medication 2.5 MILLION UNITS: at 16:31

## 2022-09-04 NOTE — FLOWSHEET NOTE
Pt presents to L&D per ambulatory for decreased FM. Denies LOF, vaginal bleeding. Reports feeling some contractions. Pt is post dates. To 708, urine specimen requested, and Carole Juárez aware of admit.

## 2022-09-04 NOTE — PROGRESS NOTES
[facility] [place] Labor Note for Low Intervention    Subjective:    Patient is working well with labor. Comfort measures include ball, positioning, assisting with relief. Support system helpful. Options  discussed. Is having irregular contractions since Cytotec but feels they are a little stronger. Desires to move to Pitocin since she made some cervical change. Objective:     VS:  height is 5' 6\" (1.676 m) and weight is 196 lb (88.9 kg). Her oral temperature is 97.9 °F (36.6 °C). Her blood pressure is 133/69 and her pulse is 69. Her respiration is 16 and oxygen saturation is 98%. FHT:    Baseline: 145   Variability: moderate   Decels: Absent   Accels: present         Contractions: irregular 3-6 mins    Cx: 2 cm 75% soft -1      Assessment/plan:   iOL post term  Cytotec po x 1  Pitocin per protocol now. Dr Lorraine Gama updated.

## 2022-09-04 NOTE — PROGRESS NOTES
[facility] Labor Note    SUBJECTIVE:    Patient is working well with induction. Feels contractions are getting stronger but tolerating very well. Sitting in bed, states has been on birth ball recently. Tolerating dinner. Support system helpful. OBJECTIVE:     VS:  height is 5' 6\" (1.676 m) and weight is 196 lb (88.9 kg). Her oral temperature is 97.9 °F (36.6 °C). Her blood pressure is 139/85 and her pulse is 89. Her respiration is 16 and oxygen saturation is 98%.      FHT:    Baseline: 140 bpm   Variability: moderate              Accels: present   Decels: Absent    Scalp electrode: No    Contraction pattern:                                  Frequency: 2-3 min                                 Duration:  sec                                 Intensity: mild                                 Pitocin: 4 mU/min                                 IUPC:  No    Cervix: deferred    Status of membranes: intact    Pain control: denies needs at this time    Maternal status (hydration, fatigue, voids): IV/PO fluids continue, voiding QS, does not appear overly fatigued     ASSESSMENT/PLAN:  IOL, post dates, decreased FM:  S/P Cytotec 25 mcg PO x 1   Pitocin continues per protocol  Regular diet   mL/hr  Reviewed position changes, continue support  FHR Category 1  GBS Positive  S/P 2 doses of PCN G, continue per protocol  Gestational thrombocytopenia  Platelets 374 on arrival

## 2022-09-04 NOTE — H&P
Pt called for flare-up in myalgia/ neck pain. Tight. No trauma. No SOB/dysphagia. Relieved with previous rx muscle relaxant. Sent Rx for lorazone trial, if persists f/u clinic.    [facility] Obstetrics History and Physical  2022  11:54 AM      SUBJECTIVE:    CHIEF COMPLAINT:  decreased fetal movement, 41/3 weeks    HISTORY OF PRESENT ILLNESS:      The patient is a 34 y.o. female at 45w2d. Concepción Hudson presents with a chief complaint as above and is being admitted for induction    Course of pregnancy complicated by:     Patient Active Problem List   Diagnosis    Family history of colon cancer    HRP (high risk pregnancy), unspecified trimester    Self-Pay    COVID-19 affecting pregnancy in first trimester    Rh negative state in antepartum period    Placenta previa in second trimester- RESOLVED    Abnormal glucose tolerance test in pregnancy    COVID-19 vaccination declined    Tetanus, diphtheria, and acellular pertussis (Tdap) vaccination declined    Positive GBS test    High risk pregnancy, antepartum         OBJECTIVE:     Estimated Due Date:   Estimated Date of Delivery: 22   Patient's last menstrual period was 2021. Confirmed by:      PRENATAL LABS:    Blood Type/Rh: A neg  Antibody Screen: negative  Hemoglobin, Hematocrit, Platelets: 43.6 / 71.9 / 161  Rubella: immune  T.  Pallidum, IgG: non-reactive   Hepatitis B Surface Antigen: non-reactive   HIV: non-reactive   Sickle Cell Screen: not done  Gonorrhea: negative  Chlamydia: negative  Urine culture: negative    1 hour Glucose Tolerance Test: 174  3 hour Glucose Tolerance Test: Fastin; 1 hour: 152; 2 hour  129; 3 hour: 114    Group B Strep: positive  Cystic Fibrosis Screen: not available  First Trimester Screen: patient declined  MSAFP/Multiple Markers: patient declined  Non-Invasive Prenatal Testing: patient declined     Steroids:  no  Date:    Date:      PAST OB HISTORY:  OB History    Para Term  AB Living   2 1 1 0 0 1   SAB IAB Ectopic Molar Multiple Live Births   0 0 0 0 0 1      # Outcome Date GA Lbr Ottoniel/2nd Weight Sex Delivery Anes PTL Lv   2 Current            1 Term 03/24/15 41w2d  7 lb 14 oz (3.572 kg) F Vag-Spont  N ARABELLA      Name: Grace Butt       Past Medical History:        Diagnosis Date    Rh incompatibility        Past Surgical History:    History reviewed. No pertinent surgical history.     Allergies:    Seasonal    Social History:    Social History     Socioeconomic History    Marital status:      Spouse name: Not on file    Number of children: Not on file    Years of education: Not on file    Highest education level: Not on file   Occupational History    Not on file   Tobacco Use    Smoking status: Never    Smokeless tobacco: Never   Vaping Use    Vaping Use: Never used   Substance and Sexual Activity    Alcohol use: Not Currently    Drug use: Never    Sexual activity: Yes     Partners: Male   Other Topics Concern    Not on file   Social History Narrative    Not on file     Social Determinants of Health     Financial Resource Strain: Not on file   Food Insecurity: Not on file   Transportation Needs: Not on file   Physical Activity: Not on file   Stress: Not on file   Social Connections: Not on file   Intimate Partner Violence: Not on file   Housing Stability: Not on file       Family History:       Problem Relation Age of Onset    Colon Polyps Mother 39    Heart Failure Father     Colon Cancer Father         passed away before finishing staging    Heart Attack Father     Diabetes Father     Colon Polyps Sister     Colon Polyps Sister     Colon Polyps Brother     Colon Cancer Maternal Grandmother         very young    Diabetes Paternal Grandmother     Diabetes Paternal Grandfather     Heart Failure Paternal Grandfather        Medications Prior to Admission:  Medications Prior to Admission: Prenatal Vit-Fe Fumarate-FA (PRENATAL VITAMIN) 27-0.8 MG TABS, Take 1 tablet by mouth daily    REVIEW OF SYSTEMS:    CONSTITUTIONAL:  Denies fever, chills, malaise  RESPIRATORY:  Denies SOB, wheezing, URI  CARDIOVASCULAR:  Denies edema, palpitations, syncope  GASTROINTESTINAL:

## 2022-09-04 NOTE — PROGRESS NOTES
OB/GYN MMW Resident Involvement Note     Date: 2022       Time: 12:11 PM   Patient Name: Giuliano Blackwood     Patient : 1990  Room/Bed: 3214/2560-53    Admission Date/Time: 2022 10:52 AM      HPI: Giuliano Blackwood is a 34 y.o.  at 45w2d who presents to L&D for decreased fetal movement. Midwife had long discussion with patient regarding risks of continuing pregnancy versus induction of labor at this time. Patient is agreeable to induction of labor. The patient reports fetal movement is present, denies contractions, denies loss of fluid, denies vaginal bleeding. Patient denies RUQ pain, nausea, vomitting, vision changes, HA.      DATING:  LMP: Patient's last menstrual period was 2021.   Estimated Date of Delivery: 22   Based on: LMP    PREGNANCY RISK FACTORS:  Patient Active Problem List   Diagnosis    Family history of colon cancer    HRP (high risk pregnancy), unspecified trimester    Self-Pay    COVID-19 affecting pregnancy in first trimester    Rh negative state in antepartum period    Placenta previa in second trimester- RESOLVED    Abnormal glucose tolerance test in pregnancy    COVID-19 vaccination declined    Tetanus, diphtheria, and acellular pertussis (Tdap) vaccination declined    Positive GBS test    High risk pregnancy, antepartum        Steroids Given In This Pregnancy:  no     REVIEW OF SYSTEMS:   Constitutional: negative fever, negative chills, negative weight changes   HEENT: negative visual disturbances, negative headaches, negative dizziness, negative hearing loss  Breast: Negative breast abnormalities, negative breast lumps, negative nipple discharge  Respiratory: negative dyspnea, negative cough, negative SOB  Cardiovascular: negative chest pain,  negative palpitations, negative arrhythmia, negative syncope   Gastrointestinal: negative abdominal pain, negative RUQ pain, negative N/V, negative diarrhea, negative constipation, negative bowel changes, negative heartburn   Genitourinary: negative dysuria, negative hematuria, negative urinary incontinence, negative vaginal discharge, negative vaginal bleeding or spotting  Dermatological: negative rash, negative pruritis, negative mole or other skin changes  Hematologic: negative bruising  Immunologic/Lymphatic: negative recent illness, negative recent sick contact  Musculoskeletal: negative back pain, negative myalgias, negative arthralgias  Neurological:  negative dizziness, negative migraines, negative seizures, negative weakness  Behavior/Psych: negative depression, negative anxiety, negative SI, negative HI    OBSTETRICAL HISTORY:   OB History    Para Term  AB Living   2 1 1 0 0 1   SAB IAB Ectopic Molar Multiple Live Births   0 0 0 0 0 1      # Outcome Date GA Lbr Ottoniel/2nd Weight Sex Delivery Anes PTL Lv   2 Current            1 Term 03/24/15 41w2d  7 lb 14 oz (3.572 kg) F Vag-Spont  N ARABELLA      Name: Gal Arellano HISTORY:   has a past medical history of Rh incompatibility. PAST SURGICAL HISTORY:   has no past surgical history on file. ALLERGIES:  is allergic to seasonal.    MEDICATIONS:  Prior to Admission medications    Medication Sig Start Date End Date Taking? Authorizing Provider   Prenatal Vit-Fe Fumarate-FA (PRENATAL VITAMIN) 27-0.8 MG TABS Take 1 tablet by mouth daily    Historical Provider, MD       FAMILY HISTORY:  family history includes Colon Cancer in her father and maternal grandmother; Colon Polyps in her brother, sister, and sister; Colon Polyps (age of onset: 39) in her mother; Diabetes in her father, paternal grandfather, and paternal grandmother; Heart Attack in her father; Heart Failure in her father and paternal grandfather. SOCIAL HISTORY:   reports that she has never smoked. She has never used smokeless tobacco. She reports that she does not currently use alcohol. She reports that she does not use drugs.     VITALS:  Vitals:    22 1107 22 Date Noted    Abnormal glucose tolerance test in pregnancy 05/18/2022     Priority: High     Glucola:174, need 3 hr GTT: (WNL) 74/152/129/114      Rh negative state in antepartum period 02/21/2022     Priority: High     RhoGam @ 28 weeks: had Rhogam 5/17/22      HRP (high risk pregnancy), unspecified trimester 01/07/2022     Priority: High     Genetic screening: DECLINES  AFP: DECLINES  CF/SMA/FragileX: DECLINES  Chart Review DR Fabrizio Harris: Shawn Kenyon MD 06/19/22           High risk pregnancy, antepartum 09/04/2022     Priority: Medium    COVID-19 vaccination declined 07/15/2022     Priority: Medium    Tetanus, diphtheria, and acellular pertussis (Tdap) vaccination declined 07/15/2022     Priority: Medium    Self-Pay 01/07/2022     Priority: Medium     Mininal labs  States anatomy scan w/MFM will be covered by [facility]- referral placed 2/18/2022       Positive GBS test 08/01/2022     PCN G in labor      Placenta previa in second trimester- RESOLVED 04/19/2022 4/18/22 EFW 1 lb, complete previa, AFV normal, normal fetal anatomy, CL normal  Precautions reviewed 4/19/2022   F/U at 28 weeks: 6/17/22 EFW 47% (AC 27%), MARGARET 14.78, previa RESOLVED- F/U with MFM PRN       COVID-19 affecting pregnancy in first trimester 02/18/2022     Sx 2/5/22  Positive test at home 2/10/22  No residual symptoms       Family history of colon cancer 07/19/2021     (1/7/22) Discussed genetic screening and will check with insurance         Plan discussed with Nazia Leslie CNM, who is agreeable. Risks, benefits, alternatives and possible complications have been discussed in detail with the patient. Admission, and post admission procedures and expectations were discussed in detail. All questions were answered.     Attending's Name: Dr. Daksha Uriarte (In-house attending)    Allie Guadalupe DO  Ob/Gyn Resident  9/4/2022, 12:11 PM

## 2022-09-05 PROBLEM — O48.0 POST-TERM PREGNANCY, 40-42 WEEKS OF GESTATION: Status: ACTIVE | Noted: 2022-09-05

## 2022-09-05 LAB — FETAL SCREEN: NORMAL

## 2022-09-05 PROCEDURE — 7200000001 HC VAGINAL DELIVERY

## 2022-09-05 PROCEDURE — 6370000000 HC RX 637 (ALT 250 FOR IP): Performed by: STUDENT IN AN ORGANIZED HEALTH CARE EDUCATION/TRAINING PROGRAM

## 2022-09-05 PROCEDURE — 1220000000 HC SEMI PRIVATE OB R&B

## 2022-09-05 PROCEDURE — 96372 THER/PROPH/DIAG INJ SC/IM: CPT

## 2022-09-05 PROCEDURE — 2580000003 HC RX 258: Performed by: STUDENT IN AN ORGANIZED HEALTH CARE EDUCATION/TRAINING PROGRAM

## 2022-09-05 PROCEDURE — 3E033VJ INTRODUCTION OF OTHER HORMONE INTO PERIPHERAL VEIN, PERCUTANEOUS APPROACH: ICD-10-PCS | Performed by: STUDENT IN AN ORGANIZED HEALTH CARE EDUCATION/TRAINING PROGRAM

## 2022-09-05 PROCEDURE — 59409 OBSTETRICAL CARE: CPT | Performed by: ADVANCED PRACTICE MIDWIFE

## 2022-09-05 PROCEDURE — 85461 HEMOGLOBIN FETAL: CPT

## 2022-09-05 PROCEDURE — 10907ZC DRAINAGE OF AMNIOTIC FLUID, THERAPEUTIC FROM PRODUCTS OF CONCEPTION, VIA NATURAL OR ARTIFICIAL OPENING: ICD-10-PCS | Performed by: STUDENT IN AN ORGANIZED HEALTH CARE EDUCATION/TRAINING PROGRAM

## 2022-09-05 PROCEDURE — 6360000002 HC RX W HCPCS: Performed by: STUDENT IN AN ORGANIZED HEALTH CARE EDUCATION/TRAINING PROGRAM

## 2022-09-05 RX ORDER — ACETAMINOPHEN 500 MG
1000 TABLET ORAL EVERY 6 HOURS
Status: DISCONTINUED | OUTPATIENT
Start: 2022-09-05 | End: 2022-09-06 | Stop reason: HOSPADM

## 2022-09-05 RX ORDER — SIMETHICONE 80 MG
80 TABLET,CHEWABLE ORAL EVERY 6 HOURS PRN
Status: DISCONTINUED | OUTPATIENT
Start: 2022-09-05 | End: 2022-09-06 | Stop reason: HOSPADM

## 2022-09-05 RX ORDER — ONDANSETRON 2 MG/ML
4 INJECTION INTRAMUSCULAR; INTRAVENOUS EVERY 4 HOURS PRN
Status: DISCONTINUED | OUTPATIENT
Start: 2022-09-05 | End: 2022-09-06 | Stop reason: HOSPADM

## 2022-09-05 RX ORDER — ONDANSETRON 4 MG/1
4 TABLET, ORALLY DISINTEGRATING ORAL 3 TIMES DAILY PRN
Qty: 21 TABLET | Refills: 0 | Status: SHIPPED | OUTPATIENT
Start: 2022-09-05

## 2022-09-05 RX ORDER — BISACODYL 10 MG
10 SUPPOSITORY, RECTAL RECTAL DAILY PRN
Status: DISCONTINUED | OUTPATIENT
Start: 2022-09-05 | End: 2022-09-06 | Stop reason: HOSPADM

## 2022-09-05 RX ORDER — LANOLIN 72 %
OINTMENT (GRAM) TOPICAL PRN
Status: DISCONTINUED | OUTPATIENT
Start: 2022-09-05 | End: 2022-09-06 | Stop reason: HOSPADM

## 2022-09-05 RX ORDER — SODIUM CHLORIDE 9 MG/ML
INJECTION, SOLUTION INTRAVENOUS PRN
Status: DISCONTINUED | OUTPATIENT
Start: 2022-09-05 | End: 2022-09-06 | Stop reason: HOSPADM

## 2022-09-05 RX ORDER — SENNA AND DOCUSATE SODIUM 50; 8.6 MG/1; MG/1
1 TABLET, FILM COATED ORAL DAILY
Qty: 14 TABLET | Refills: 0 | Status: SHIPPED | OUTPATIENT
Start: 2022-09-05 | End: 2022-09-19

## 2022-09-05 RX ORDER — SODIUM CHLORIDE 0.9 % (FLUSH) 0.9 %
5-40 SYRINGE (ML) INJECTION PRN
Status: DISCONTINUED | OUTPATIENT
Start: 2022-09-05 | End: 2022-09-06 | Stop reason: HOSPADM

## 2022-09-05 RX ORDER — IBUPROFEN 600 MG/1
600 TABLET ORAL EVERY 6 HOURS PRN
Qty: 30 TABLET | Refills: 0 | Status: SHIPPED | OUTPATIENT
Start: 2022-09-05

## 2022-09-05 RX ORDER — SODIUM CHLORIDE 0.9 % (FLUSH) 0.9 %
5-40 SYRINGE (ML) INJECTION EVERY 12 HOURS SCHEDULED
Status: DISCONTINUED | OUTPATIENT
Start: 2022-09-05 | End: 2022-09-06 | Stop reason: HOSPADM

## 2022-09-05 RX ORDER — DOCUSATE SODIUM 100 MG/1
100 CAPSULE, LIQUID FILLED ORAL 2 TIMES DAILY
Status: DISCONTINUED | OUTPATIENT
Start: 2022-09-05 | End: 2022-09-06 | Stop reason: HOSPADM

## 2022-09-05 RX ORDER — IBUPROFEN 800 MG/1
800 TABLET ORAL EVERY 8 HOURS
Status: DISCONTINUED | OUTPATIENT
Start: 2022-09-05 | End: 2022-09-06 | Stop reason: HOSPADM

## 2022-09-05 RX ADMIN — DOCUSATE SODIUM 100 MG: 100 CAPSULE ORAL at 09:41

## 2022-09-05 RX ADMIN — DOCUSATE SODIUM 100 MG: 100 CAPSULE ORAL at 20:45

## 2022-09-05 RX ADMIN — SODIUM CHLORIDE, PRESERVATIVE FREE 30 ML: 5 INJECTION INTRAVENOUS at 17:16

## 2022-09-05 RX ADMIN — ACETAMINOPHEN 1000 MG: 500 TABLET ORAL at 07:03

## 2022-09-05 RX ADMIN — WITCH HAZEL 40 EACH: 500 SOLUTION RECTAL; TOPICAL at 04:03

## 2022-09-05 RX ADMIN — BENZOCAINE AND LEVOMENTHOL: 200; 5 SPRAY TOPICAL at 04:04

## 2022-09-05 RX ADMIN — IBUPROFEN 800 MG: 800 TABLET, FILM COATED ORAL at 01:32

## 2022-09-05 RX ADMIN — ACETAMINOPHEN 1000 MG: 500 TABLET ORAL at 18:37

## 2022-09-05 RX ADMIN — HUMAN RHO(D) IMMUNE GLOBULIN 300 MCG: 300 INJECTION, SOLUTION INTRAMUSCULAR at 12:33

## 2022-09-05 RX ADMIN — IBUPROFEN 800 MG: 800 TABLET, FILM COATED ORAL at 09:41

## 2022-09-05 RX ADMIN — IBUPROFEN 800 MG: 800 TABLET, FILM COATED ORAL at 17:43

## 2022-09-05 RX ADMIN — Medication 166.7 ML: at 00:43

## 2022-09-05 ASSESSMENT — PAIN - FUNCTIONAL ASSESSMENT: PAIN_FUNCTIONAL_ASSESSMENT: ACTIVITIES ARE NOT PREVENTED

## 2022-09-05 ASSESSMENT — PAIN DESCRIPTION - LOCATION
LOCATION: ABDOMEN;PERINEUM
LOCATION: GROIN
LOCATION: ABDOMEN

## 2022-09-05 ASSESSMENT — PAIN DESCRIPTION - DESCRIPTORS
DESCRIPTORS: ACHING
DESCRIPTORS: ACHING

## 2022-09-05 ASSESSMENT — PAIN SCALES - GENERAL
PAINLEVEL_OUTOF10: 3
PAINLEVEL_OUTOF10: 0
PAINLEVEL_OUTOF10: 1
PAINLEVEL_OUTOF10: 4
PAINLEVEL_OUTOF10: 4

## 2022-09-05 NOTE — PROGRESS NOTES
Labor Progress Note    Annmarie Simpson is a 34 y.o. female  at 45w2d  The patient was seen and examined. Her pain is well controlled. She reports fetal movement is present, complains of contractions, denies loss of fluid, denies vaginal bleeding.        Vital Signs:  Vitals:    22 1228 22 1630 22 1700 22 1800   BP: 113/80 133/69 128/78 139/85   Pulse: 75 69 71 89   Resp: 16 16 16 16   Temp:  97.9 °F (36.6 °C)     TempSrc:  Oral     SpO2:       Weight:       Height:             FHT: 130, moderate variability, accelerations present, decelerations absent  Contractions: regular, every 2-3 minutes    SVE: Deferred    Membranes: Intact  Scalp Electrode in place: absent  Intrauterine Pressure Catheter in Place: absent    Interventions: none    Assessment/Plan:  Annmarie Simpson is a 34 y.o. female  at 45w2d admitted for IOL 2/2 Post-dates    - GBS positive, Pen G for GBS prophylaxis   - VSS, afebrile   - Pitocin per protocol   - S/p Cytotec 25 mcg PO x1    - Will monitor closely      Attending updated and in agreement with plan    Mayra Lopez DO  Ob/Gyn Resident  2022, 8:13 PM

## 2022-09-05 NOTE — DISCHARGE SUMMARY
Obstetric Discharge Summary  [facility]    Patient Name: Ubaldo Schirmer  Patient : 1990  Primary Care Physician: No primary care provider on file. Admit Date: 2022    Principal Diagnosis: IUP at 41w4d, admitted for IOL 2/2 Post dates     Her pregnancy has been complicated by:   Patient Active Problem List   Diagnosis    Family history of colon cancer    HRP (high risk pregnancy), unspecified trimester    Self-Pay    COVID-19 affecting pregnancy in first trimester    Rh negative state in antepartum period    Placenta previa in second trimester- RESOLVED    Abnormal glucose tolerance test in pregnancy    COVID-19 vaccination declined    Tetanus, diphtheria, and acellular pertussis (Tdap) vaccination declined    Positive GBS test    High risk pregnancy, antepartum     22 F Apg  Wt 8#5    Post-term pregnancy, 40-42 weeks of gestation       Infection Present?: No  Hospital Acquired: No    Surgical Operations & Procedures:  Analgesia: None  Delivery Type: Spontaneous Vaginal Delivery: See Labor and Delivery Summary   Laceration(s): midline sulcal tear, no repair    Consultations: none     Pertinent Findings & Procedures:   Ubaldo Schirmer is a 34 y.o. female  at 41w4d admitted for IOL 2/2 post dates; received Pen G, Cytotec 25 PO, Pitocin, AROM (clr). She delivered by spontaneous vaginal a Live Born infant on 22. Information for the patient's :  Abran Valero [[id number]]   female   Birth Weight: 8 lb 5.2 oz (3.775 kg)     Apgars: 8 at 1 minute and 9 at 5 minutes.      Postpartum course: normal.      Course of patient: uncomplicated    Discharge to: Home    Readmission planned: no     Recommendations on Discharge:     Medications:      Medication List        START taking these medications      ibuprofen 600 MG tablet  Commonly known as: ADVIL;MOTRIN  Take 1 tablet by mouth every 6 hours as needed for Pain     ondansetron 4 MG disintegrating tablet  Commonly known as: ZOFRAN-ODT  Take 1 tablet by mouth 3 times daily as needed for Nausea or Vomiting     sennosides-docusate sodium 8.6-50 MG tablet  Commonly known as: SENOKOT-S  Take 1 tablet by mouth daily for 14 days            CONTINUE taking these medications      Prenatal Vitamin 27-0.8 MG Tabs               Where to Get Your Medications        These medications were sent to [facility] [address] [facility], [address]  [address], [address]  [address]      Phone: [phone]   ibuprofen 600 MG tablet  ondansetron 4 MG disintegrating tablet  sennosides-docusate sodium 8.6-50 MG tablet           Activity: pelvic rest x 6 weeks, no driving on narcotics, no lifting greater than 15 lbs  Diet: regular diet  Follow up: 2 weeks for  PP appointment    Condition on discharge: stable    Discharge date: 9/6/22    Tad Gómez DO  Ob/Gyn Resident    Comments:  Home care and follow-up care were reviewed. Pelvic rest, and birth control were reviewed. Signs and symptoms of mastitis and post partum depression were reviewed. The patient is to notify her physician if any of these occur. The patient was counseled on secondary smoke risks and the increased risk of sudden infant death syndrome and respiratory problems to her baby with exposure. She was counseled on various alternate recommendations to decrease the exposure to secondary smoke to her children.

## 2022-09-05 NOTE — PROGRESS NOTES
[facility] Labor Note    SUBJECTIVE:    Patient is working well with induction. Comfort measures include position changes, assisting with relief but she reports contractions are stronger and  she would like to discuss her options. Support system helpful. OBJECTIVE:     VS:  height is 5' 6\" (1.676 m) and weight is 196 lb (88.9 kg). Her oral temperature is 97.9 °F (36.6 °C). Her blood pressure is 127/83 and her pulse is 73. Her respiration is 16 and oxygen saturation is 98%. FHT:    Baseline: 135 bpm   Variability: moderate              Accels: present   Decels: Absent    Scalp electrode: No    Contraction pattern:                                  Frequency: 2-3 min                                 Duration: 60-70 sec                                 Intensity: moderate                                 Pitocin: 4 mU/min                                 IUPC:  No    Cervix:             Dilation: 2-3 cm            Effacement: 70%            Station: -1            Position: mid            Consistency: soft    Status of membranes: intact    Pain control: position changes and breathing, discussed all pain control options, would like Morphine/Compazine     Maternal status (hydration, fatigue, voids): IV/PO fluids continue, does not appear overly fatigued but would like to try to rest before active labor, voiding QS     ASSESSMENT/PLAN:  IOL, post dates:  S/P Cytotec 25 mcg PO x 1  Pitocin continues per protocol  VSS  Regular diet  Morphine/Compazine ordered for pain relief/rest. Patient would like to avoid epidural this labor.    Reviewed option for AROM, open to it after an hour of rest.   Continue position changes as tolerated  FHR Category 1  GBS Positive  Continue PCN G per protocol, adequately treated

## 2022-09-05 NOTE — PROGRESS NOTES
[facility] Labor Note    SUBJECTIVE:    Patient is working well with labor. Comfort measures include morphine/compazine IM, assisting with relief. Does still report contraction pain but feels she is able to rest between. Continues breathing techniques and position changes. Dozing on and off. Support system helpful. OBJECTIVE:     VS:  height is 5' 6\" (1.676 m) and weight is 196 lb (88.9 kg). Her oral temperature is 97.9 °F (36.6 °C). Her blood pressure is 134/82 and her pulse is 72. Her respiration is 16 and oxygen saturation is 98%. FHT:    Baseline: 130 bpm   Variability: moderate              Accels: present   Decels: Absent    Scalp electrode: No    Contraction pattern:                                  Frequency: 2 min                                 Duration: 60-80 sec                                 Intensity: moderate                                 Pitocin: 4 mU/min                                 IUPC:  No    Cervix:             Dilation: 3            Effacement: 80            Station: -1            Position: mid            Consistency: soft    Status of membranes: AROM 2223 (clear, small amount) - FHT stable during and after procedure    Pain control: Morphine/Compazine provided some relief, still coping well.      Maternal status (hydration, fatigue, voids): IV/PO fluids continue, voiding QS, resting on and off     ASSESSMENT/PLAN:  IOL, post dates:   S/P Cytotec 25 mcg PO x 1  Pitocin continues per protocol (started at 1631)  VSS, afebrile  Regular diet  S/P Morphine/Compazine IM for pain relief, resting on and off  S/P AROM (clear, small amount)  Repositioned to left side lying with peanut ball  Anticipate vaginal delivery   FHR Category 1  GBS Positive  Adequately treated, continue PCN G per protocol

## 2022-09-05 NOTE — L&D DELIVERY NOTE
Mother's Information      Labor Events     Labor?: No  Cervical Ripening:   Now               Hussein Baby Girl Romeo Lopez [[id number]]      Labor Events     Labor?: No   Steroids?: None  Cervical Ripening Date/Time:     Cervical Ripening Type: Misoprostol  Antibiotics Received during Labor?: Yes  Rupture Identifier: Sac 1   Rupture Date/Time: 22 22:23:00   Rupture Type: AROM  Fluid Color: Clear  Fluid Odor: None  Induction: Oxytocin  Augmentation: AROM  Labor Complications: None       Anesthesia    Method: None       Start Pushing      Labor onset date/time:   Now     Dilation complete date/time:   Now     Start pushing date/time:    Decision date/time (emergent ):           Delivery ()      Delivery Date/Time:  22 00:38:00   Delivery Type: Vaginal, Spontaneous    Details:             Presentation    Presentation: Vertex  Position: Left  _: Occiput  _: Anterior       Shoulder Dystocia    Shoulder Dystocia Present?: No  Add Second Maneuver  Add Third Maneuver  Add Fourth Maneuver  Add Fifth Maneuver  Add Sixth Maneuver  Add Seventh Maneuver  Add Eighth Maneuver  Add Ninth Maneuver       Assisted Delivery Details    Forceps Attempted?: No  Vacuum Extractor Attempted?: No       Document Additional Attempt         Document Additional Attempt                 Cord    Vessels: 3 Vessels  Complications: None  Delayed Cord Clamping?: Yes  Cord Clamped Date/Time: 2022 00:40:00  Cord Blood Disposition: Lab  Gases Sent?: Yes       Placenta    Date/Time: 2022 00:43:00  Removal: Spontaneous  Appearance: Intact       Lacerations    Episiotomy: None  Perineal Lacerations: None  Other Lacerations: no non-perineal laceration       Vaginal Counts    Initial Count Personnel: VASHTI Romano RN  Initial Count Verified By: Fernando Jack CNM    Sponges [place] [employer]   Initial Counts Correct  Correct   Final Counts Correct  Correct   Final Count Personnel: Fernando Jack CNM  Final Count Verified By: DR Beatriz Marshall  If the count is incorrect due to Intentionally Retained Foreign Object (IRFO) add the IRFO LDA in Lines/Drains. Add LDA: Link to [facility]       Blood Loss  Mother: Juan José Montalvo #[id number]     Start of Mother's Information      Delivery Blood Loss  22 1238 - 22 0113      None                 End of Mother's Information  Mother: Juan José Montalvo #[id number]                Delivery Providers    Delivering clinician: ERVIN Larry CNM     Provider Role     Obstetrician    Pasquale Holley, RN Primary Nurse    Brisa Ott, RN Primary  Nurse     NICU Nurse     Neonatologist     Anesthesiologist     Nurse Anesthetist     Nurse Practitioner    ERVIN Larry CNM Midwife     Nursery Nurse    Elidia Beltran DO Resident               Assessment    Living Status: Living     Apgar Scoring Key:    0 1 2    Skin Color: Blue or pale Acrocyanotic Completely pink    Heart Rate: Absent <100 bpm >100 bpm    Reflex Irritability: No response Grimace Cry or active withdrawal    Muscle Tone: Limp Some flexion Active motion    Respiratory Effort: Absent Weak cry; hypoventilation Good, crying                      Skin Color:   Heart Rate:   Reflex Irritability:   Muscle Tone:   Respiratory Effort: Total:            1 Minute:    0    2    2    2    2    8        Apgar 1 total from OB History    5 Minute:    1    2    2    2    2    9        Apgar 5 total from OB History    10 Minute:              15 Minute:              20 Minute:                        Apgars Assigned By: VASHTI Hunter 1163 RN              Resuscitation    Method: Bulb Suction, Stimulation             [place] Measurements      Birth Weight: 3775 g Birth Length: 0.533 m              Title      Skin to Skin Initiation Date/Time: 22 00:38:00 EDT     Skin to Skin With: Mother     Skin to Skin End Date/Time:                    [facility] Vaginal Delivery Summary          Information for the patient's :  Payton Dallas [[id number]]        Pre-operative Diagnosis:  Term pregnancy, Induced labor, and Single fetus    Post-operative Diagnosis:  Same, delivered    Procedure:  Spontaneous vaginal delivery    Provider:   Dia Mcclelland Dr. for the patient's :  Scott Malhotra [[id number]]        Anesthesia:  none    Estimated blood loss:  see QBL flowsheet    Specimen:  Placenta not sent to pathology     Cord blood sent Yes    Complications:  none    Condition:  infant stable to general nursery and mother stable    Details of Procedure: The patient is a 34 y.o. female at 40w3d   OB History          2    Para   1    Term   1            AB        Living   1         SAB        IAB        Ectopic        Molar        Multiple        Live Births   1             who was admitted for induction of labor for post dates. She received the following interventions:   1. Cytotec 25 mcg PO x 1  2. Pitocin induction  3. AROM  4. Postpartum pitocin    Pre-Delivery Note:   Chris Dallas reporting more pressure, SVE 9/100/0, repositioned to side lying. Difficult to trace, baseline 120 bpm, pitocin continues. Dr. Daniela Choudhary updated. Shortly after urge to push increased and SVE 10/100/0, see Delivery Summary. Delivery Summary:  The patient progressed, did not receive an epidural, became Complete and started to push with strong effort. She progressed to spontaneous vaginal delivery of viable female infant, who was delivered atraumatically, shoulders easily delivered and placed on mother's abdomen. The cord clamping was delayed. The cord was clamped and cut and cord blood obtained. The delivery of the placenta was spontaneous, Moya presentation. Placental examination revealed a grossly intact placenta with a 3 vessel cord. The uterus was massaged to firm and contracted immediately, with bleeding under control. IV Pitocin infusion started.   The perineum and vagina were inspected and no lacerations were found. Vaginal abrasion noted that was hemostatic and not in need of repair. Mother and baby stable.  Baby to breast.

## 2022-09-05 NOTE — CARE COORDINATION
CASE MANAGEMENT POST-PARTUM TRANSITIONAL CARE PLAN    High risk pregnancy, antepartum [O09.90]    OB Provider: [facility] met w/ Betty Fam at bedside to discuss DCP. She is S/P  on 2022    Writer verified name/address/phone number correct on facesheet. She states she lives with her  Carolyn Jackson. Betty Fam verbalized no problems with transportation to and from doctors appointments or with paying for medications upon discharge home. Patient is self pay. Encouraged mom to speak with  to let them know to bill for self pay price. Mom has done research and has a plan. Monse Alonso confirmed a safe place for infant to sleep at home. Infant name on BC: Ana Lemon  . Infant PCP [facility].    DME: no  HOME CARE: no    Anticipate DC of couplet 2022    Readmission Risk              Risk of Unplanned Readmission:  5

## 2022-09-05 NOTE — FLOWSHEET NOTE
Patient admitted to room 746 from L&D via wheelchair. Oriented to room and surroundings. Plan of care reviewed. Verbalized understanding. Instructed on infant security and safe sleep practices. Preventing falls education provided . The following handouts given: A New Beginning: Your Guide to Postpartum Care, Rounding, gs Security System,Babies Cry A lot, Safe Sleep, Security and Visitation Guidelines. Call light placed within reach.

## 2022-09-05 NOTE — PROGRESS NOTES
POST PARTUM DAY # 1    Annmarie Simpson is a 34 y.o. female  This patient was seen & examined today. Her pregnancy was complicated by:   Patient Active Problem List   Diagnosis    Family history of colon cancer    HRP (high risk pregnancy), unspecified trimester    Self-Pay    COVID-19 affecting pregnancy in first trimester    Rh negative state in antepartum period    Placenta previa in second trimester- RESOLVED    Abnormal glucose tolerance test in pregnancy    COVID-19 vaccination declined    Tetanus, diphtheria, and acellular pertussis (Tdap) vaccination declined    Positive GBS test    High risk pregnancy, antepartum     22 F Apg  Wt 8#5    Post-term pregnancy, 40-42 weeks of gestation       Today she is doing well without any chief complaint. Her lochia is light. She denies chest pain, shortness of breath, headache, lightheadedness, blurred vision, and peripheral edema. She is  breast feeding and she denies any breast tenderness. She is ambulating well. Her voiding pattern is normal. I reviewed signs and symptoms of post partum depression with the patient, she currently denies any of these symptoms. She is tolerating solids.      Vital Signs:  Vitals:    22 0245 22 0317 22 0815 22 1645   BP: 123/83 123/77 118/82 107/75   Pulse: 62 68 66 73   Resp: 16 17 18 17   Temp:  97.7 °F (36.5 °C) 97.8 °F (36.6 °C) 98 °F (36.7 °C)   TempSrc:  Oral Oral Oral   SpO2:  97% 96% 96%   Weight:       Height:             Physical Exam:  General:  no apparent distress, alert, and cooperative  Neurologic:  alert, oriented, normal speech, no focal findings or movement disorder noted  Lungs:  no increased work of breathing, good air exchange, and no retractions  Heart:  regular rate   Abdomen: abdomen soft, non-distended, appropriately tender  Fundus: appropriately tender, normal size, firm, below umbilicus  Extremities:  no calf tenderness, non edematous    Lab:  Lab Results   Component Value Date    HGB 11.4 (L) 2022     Lab Results   Component Value Date    HCT 34.4 (L) 2022       Assessment/Plan:  Clay Medina is a [id number] PPD # 1 s/p    - Doing well, VSS   - female infant in [address]   - Encourage ambulation   - Postpartum Hgb/Hct if sx  Rh negative/Rubella immune   - Rhogam indicated and issued  Breast feeding    - Denies breast tenderness   - Counseled on s/sx of mastitis  Thrombocytopenia   - Platelets 669 on    - Not requiring further management  Continue post partum care  BMI 31    Counseling Completed:  Secondary Smoke risks and Sudden Infant Death Syndrome were reviewed with recommendations. Infant sleeping, \"back to sleep\" and avoidance of co-sleeping recommendations were reviewed. Signs and Symptoms of Post Partum Depression were reviewed. The patient is to call if any occur. Signs and symptoms of Mastitis were reviewed. The patient is to call if any occur for follow up. Discharge instructions including pelvic rest, no driving with pain medicine and office follow-up were reviewed with patient     Attending Physician: Dr. Mushtaq Wu MD  Ob/Gyn Resident   2022, 7:21 PM      Date: 2022  Time: 11:28 AM      Patient Name: Clay Medina  Patient : 1990  Room/Bed: 0902/5255-93  Admission Date/Time: 2022 10:52 AM        Attending Physician Statement  I have discussed the care of Clay Medina, including pertinent history and exam findings with the resident. I have reviewed and edited their note in the electronic medical record. The key elements of all parts of the encounter have been performed/reviewed by me . I agree with the assessment, plan and orders as documented by the resident. The level of care submitted represents to the best of my ability the care documented in the medical record today. GC Modifier. This service has been performed in part by a resident under the direction of a teaching physician.     PPD#1 s/p . Doing well, no complaints Desires DC home.      Attending's Name:  Lamin Lopez, DO

## 2022-09-06 VITALS
OXYGEN SATURATION: 98 % | WEIGHT: 196 LBS | DIASTOLIC BLOOD PRESSURE: 85 MMHG | RESPIRATION RATE: 18 BRPM | HEIGHT: 66 IN | SYSTOLIC BLOOD PRESSURE: 140 MMHG | HEART RATE: 95 BPM | BODY MASS INDEX: 31.5 KG/M2 | TEMPERATURE: 98.1 F

## 2022-09-06 LAB
BLD PROD TYP BPU: NORMAL
STATUS OF UNITS: NORMAL
TRANSFUSION STATUS: NORMAL
UNIT DIVISION: 0
UNIT NUMBER: NORMAL

## 2022-09-06 PROCEDURE — 6370000000 HC RX 637 (ALT 250 FOR IP): Performed by: STUDENT IN AN ORGANIZED HEALTH CARE EDUCATION/TRAINING PROGRAM

## 2022-09-06 RX ADMIN — ACETAMINOPHEN 1000 MG: 500 TABLET ORAL at 15:04

## 2022-09-06 RX ADMIN — DOCUSATE SODIUM 100 MG: 100 CAPSULE ORAL at 08:17

## 2022-09-06 RX ADMIN — ACETAMINOPHEN 1000 MG: 500 TABLET ORAL at 08:17

## 2022-09-06 RX ADMIN — IBUPROFEN 800 MG: 800 TABLET, FILM COATED ORAL at 06:04

## 2022-09-06 RX ADMIN — WITCH HAZEL: 500 SOLUTION RECTAL; TOPICAL at 10:21

## 2022-09-06 RX ADMIN — IBUPROFEN 800 MG: 800 TABLET, FILM COATED ORAL at 12:51

## 2022-09-06 ASSESSMENT — PAIN DESCRIPTION - LOCATION
LOCATION: PERINEUM
LOCATION: ABDOMEN

## 2022-09-06 ASSESSMENT — PAIN DESCRIPTION - DESCRIPTORS
DESCRIPTORS: ACHING
DESCRIPTORS: CRAMPING
DESCRIPTORS: CRAMPING
DESCRIPTORS: ACHING;CRAMPING

## 2022-09-06 ASSESSMENT — PAIN SCALES - GENERAL
PAINLEVEL_OUTOF10: 2
PAINLEVEL_OUTOF10: 1
PAINLEVEL_OUTOF10: 3
PAINLEVEL_OUTOF10: 2

## 2022-09-06 ASSESSMENT — PAIN DESCRIPTION - ORIENTATION: ORIENTATION: MID;LOWER

## 2022-09-06 NOTE — DISCHARGE INSTRUCTIONS
Follow-up with your OB doctor in 2 weeks or as specified by your physician. Please refer to A NEW BEGINNING- YOUR PERSONAL GUIDE TO POSTPARTUM CARE book provided in your room. Please take this book home with you to refer to. For Breastfeeding moms, you can contact our lactation specialist,  with any problems or questions you may have. Phone number [phone]. Feel free to leave voice mail and your call will be returned as soon as possible. DIET  Eat a well balanced diet focusing on foods high in fiber and protein. Drink 8-10 glasses of fluids daily, especially water. To avoid constipation you may take a mild stool softener as recommended by your doctor or midwife. ACTIVITY  Gradually increase your activity. Resume exercise regimen only after advise by your doctor or midwife. Avoid lifting anything heavier than your baby or a gallon of milk for SIX weeks. Avoid driving 1 week for vaginal delivery and 2 weeks for  section, unless otherwise instructed by physician. Rise slowly from a lying to sitting and then a standing position. Climb stairs carefully. Use caution when carrying your baby up and down the stairs. NO SEXUAL Activity for 6 weeks or until advised by your doctor; Nothing in vagina: intercourse, tampons, or douching. Be prepared to discuss family planning at your follow-up OB visit. You may feel tired or have a lack of energy. You may continue your prenatal vitamin to replenish nutrients post delivery. Nap when baby naps to catch up on sleep. May shower, NO tub baths, swimming, or hot tubs. EMOTIONS  You may feel serrano, sad, teary, & overwhelmed for the first 2 weeks postpartum. Contact your OB provider if you feel you may be showing signs of postpartum depression, or have thoughts of harming yourself or your infant. If infant will not stop crying, contact another adult for help or place infant in their crib on their back and take a break.   NEVER shake your or a green color to your vaginal bleeding. If you have pain that cannot be relieved. You have persistent burning with urination or frequency. Call if you have concerns about your well-being. You are unable to sleep, eat, or are having thoughts of harming yourself or your baby. You have a red, warm, tender area in you calf.

## 2022-09-06 NOTE — FLOWSHEET NOTE
I have reviewed all AWHONN Post-Birth Warning Signs and essential teaching points for pulmonary embolism, cardiac disease, hypertensive disorders of pregnancy, obstetric hemorrhage, venous thromboembolism, infection, and postpartum depression with the patient and father (support person) . I have informed the patient on when to call their healthcare provider and when to call 911. I have discussed with the patient  the importance of scheduling a follow-up visit with their physician, nurse practitioner or midwife and provided them with correct contact information for appointment. I have provided the patient with a copy of the \"Save Your Life\" handout. The patient has acknowledged receiving and understanding this education with her signature.

## 2022-09-06 NOTE — PLAN OF CARE
Problem: Pain  Goal: Verbalizes/displays adequate comfort level or baseline comfort level  9/6/2022 1235 by Zulema Zamudio RN  Outcome: Completed  9/6/2022 0523 by Mason Valencia RN  Outcome: Progressing

## 2022-09-06 NOTE — LACTATION NOTE
Mom reports baby feeding well at breast, has lots of questions re:breastfeeding. Had some feeding difficulties with last baby. Reviewed positioning and technique. Discussed discharge instructions and LC follow up if she needs further assistance.

## 2022-09-06 NOTE — PROGRESS NOTES
CLINICAL PHARMACY NOTE: MEDS TO BEDS    Total # of Prescriptions Filled: 3   The following medications were delivered to the patient:  Ondansetron  Senexon  ibuprofen    Additional Documentation:

## 2022-09-19 NOTE — PROGRESS NOTES
[facility]  [address] [place] [address] [address] [place] [id number]  Dept: [phone]    Patient Name: Alexandr May  Patient : 1990  MRN #: [id number]  [facility] #: [id number]    Date: 2022  Time: 6:33 PM    Subjective:   HPI:  DELIVERY DATE: 22  TYPE OF DELIVERY: normal spontaneous vaginal delivery  PROVIDER: Mario Frederick CNM  PERINEUM: intact    Michoacano Sheppard was seen for her 2 week visit. Her Female infant is healthy. Pregnancy: Jane Kang  Sex: Female  Support person: Piotr     Delivery Plans  Planned delivery method: Vaginal  Planned delivery location: [facility]  Planned anesthesia: None     Post-Delivery Plans  Feeding intentions: Breast Milk   She is breast feeding. She is breastfeeding without difficulty. She is not experiencing pain. She is rating her pain 0  She reports her lochia is staining only  She reports rectal perineal discomfort. C/o hemorrhoids. Her discomfort improves with soft stools. The stool softeners gave her diarrhea. She is using OTC hemorrhoid cream and tucks pads. She denies urinary incontinence. Her bowels have not returned to her normal pattern. She is back to her normal activity pattern. Michoacano Sheppard has not engaged in intercourse. She does not report a mood disorder. She feels she is having difficulty coping. Michoacano Sheppard feels her family adjustment is effective. She reports an overall positive birth experience. [place]on score: 4  PHQ Scores 2021 10/23/2019   PHQ2 Score 0 0   PHQ9 Score 0 0     Interpretation of Total Score Depression Severity: 1-4 = Minimal depression, 5-9 = Mild depression, 10-14 = Moderate depression, 15-19 = Moderately severe depression, 20-27 = Severe depression  CEDRIC 7 SCORE 2021   CEDRIC-7 Total Score 0     Interpretation of CEDRIC-7 score: 5-9 = mild anxiety, 10-14 = moderate anxiety, 15+ = severe anxiety.  Recommend referral to behavioral health for scores 10 or greater. Past Medical History:   Diagnosis Date    Rh incompatibility       No past surgical history on file. Social History     Socioeconomic History    Marital status:      Spouse name: Not on file    Number of children: Not on file    Years of education: Not on file    Highest education level: Not on file   Occupational History    Not on file   Tobacco Use    Smoking status: Never    Smokeless tobacco: Never   Vaping Use    Vaping Use: Never used   Substance and Sexual Activity    Alcohol use: Not Currently    Drug use: Never    Sexual activity: Yes     Partners: Male   Other Topics Concern    Not on file   Social History Narrative    Not on file     Social Determinants of Health     Financial Resource Strain: Not on file   Food Insecurity: Not on file   Transportation Needs: Not on file   Physical Activity: Not on file   Stress: Not on file   Social Connections: Not on file   Intimate Partner Violence: Not on file   Housing Stability: Not on file      Allergies   Allergen Reactions    Seasonal           Current Outpatient Medications   Medication Sig Dispense Refill    ibuprofen (ADVIL;MOTRIN) 600 MG tablet Take 1 tablet by mouth every 6 hours as needed for Pain 30 tablet 0    ondansetron (ZOFRAN-ODT) 4 MG disintegrating tablet Take 1 tablet by mouth 3 times daily as needed for Nausea or Vomiting 21 tablet 0    sennosides-docusate sodium (SENOKOT-S) 8.6-50 MG tablet Take 1 tablet by mouth daily for 14 days 14 tablet 0    Prenatal Vit-Fe Fumarate-FA (PRENATAL VITAMIN) 27-0.8 MG TABS Take 1 tablet by mouth daily       No current facility-administered medications for this visit.      OB History    Para Term  AB Living   2 2 2     2   SAB IAB Ectopic Molar Multiple Live Births           0 2      # Outcome Date GA Lbr Ottoniel/2nd Weight Sex Delivery Anes PTL Lv   2 Term 22 41w4d  8 lb 5.2 oz (3.775 kg) F Vag-Spont None N ARABELLA   1 Term 03/24/15 41w2d  7 lb 14 oz (3.572 kg) F Vag-Spont  N ARABELLA         Review of Systems: all 10 other organ systems reviewed and are negative except if noted in HPI      OBJECTIVE:   Wt Readings from Last 3 Encounters:   09/04/22 196 lb (88.9 kg)   09/01/22 196 lb (88.9 kg)   08/24/22 195 lb 14.4 oz (88.9 kg)       LMP 11/18/2021   Exam     Constitutional:  Awake, alert, cooperative, no apparent distress. Appears to be bonding well with baby, does not appear overly stressed with infant handling            ASSESSMENT/PLAN:      Visit Diagnoses         Codes    Postpartum care and examination    -  Primary Z39.2    Lactating mother     Z39.1    Hemorrhoids, unspecified hemorrhoid type     K64.9          Mg 400 mg daily for hemorrhoids, stool softener EOD PRN if stools get firm again. Continue to use tucks, OTC hemorrhoid cram. Educated pt hemorrhoids will take several weeks to resolve. 2  week postpartum visit  Labs were not ordered. Return to the office in 4 weeks. She will return for 6 week PP visit  breast feeding  Signs & Symptoms of mastitis & plugged milk duct reviewed; notify if occurs  Lactation resources reviewed  Family planning/birth spacing needs  Family planning counseling was initiated/completed. She desires pills for contraception. Smoker/non-smoker  Secondary smoke risks were reviewed, including increased risks of respiratory     problems, Sudden Infant Death Syndrome, and potential malignancies. The patient, Alexandr May,  was seen with a total time spent of 15 minutes for the visit on this date of service by the [facility]  The time component, involved both face-to-face (counseling and education)  and non face-to-face time (care coordination), spent in determining the total time component.       Education   - Safe sleep   - s/s mastitis or plugged milk duct

## 2022-09-20 ENCOUNTER — POSTPARTUM VISIT (OUTPATIENT)
Dept: OBGYN CLINIC | Age: 32
End: 2022-09-20

## 2022-09-20 VITALS — SYSTOLIC BLOOD PRESSURE: 110 MMHG | DIASTOLIC BLOOD PRESSURE: 78 MMHG | WEIGHT: 178.4 LBS | BODY MASS INDEX: 28.79 KG/M2

## 2022-09-20 DIAGNOSIS — K64.9 HEMORRHOIDS, UNSPECIFIED HEMORRHOID TYPE: ICD-10-CM

## 2022-09-20 PROCEDURE — 99024 POSTOP FOLLOW-UP VISIT: CPT | Performed by: ADVANCED PRACTICE MIDWIFE

## 2022-09-20 NOTE — PROGRESS NOTES
Pt is here today for her PP                Pt has questions regarding working out again and hemorrhoids       EPDS 4

## 2022-10-09 NOTE — PROGRESS NOTES
Patient Name: Kevin Mcnair  Patient : 1990  MRN #: [id number]  [facility] #: [id number]  [address] [place] [address] [facility]  [address] [place] [address] [address].   [address] [place], [place] [id number] [facility] [id number]  Dept: [phone]   Date: 10/10/2022  Time: 10:22 AM      Chief Complaint   Patient presents with    Postpartum Care       Subjective  HPI:  DELIVERY DATE: 22  TYPE OF DELIVERY: normal spontaneous vaginal delivery  PROVIDER: Jennifer Dejesus CNM  PERINEUM: intact    Valerie Brunson was seen for her 6 week visit. Her Female infant is healthy. She is breast feeding. She is breastfeeding without difficulty. She is not experiencing pain. She is rating her pain   She reports her lochia is no bleeding  She reports none perineal discomfort. She does still feel some pelvic floor discomforts. She denies urinary incontinence. Her bowels have not returned to her normal pattern. She is still having intermittent constipation. She is back to her normal activity pattern. She has not her first menses. She does have good support at home. She has been sleeping well. Valerie Brunson has not engaged in intercourse. She does not report a mood disorder. She feels she is not having difficulty coping. Valerie Brunson feels her family adjustment is effective. She reports an overall positive birth experience. Her Guilford Score is 1. This score does match my assessment. PHQ Scores 2021 10/23/2019   PHQ2 Score 0 0   PHQ9 Score 0 0     Interpretation of Total Score Depression Severity: 1-4 = Minimal depression, 5-9 = Mild depression, 10-14 = Moderate depression, 15-19 = Moderately severe depression, 20-27 = Severe depression  CEDRIC 7 SCORE 2021   CEDRIC-7 Total Score 0     Interpretation of CEDRIC-7 score: 5-9 = mild anxiety, 10-14 = moderate anxiety, 15+ = severe anxiety. Recommend referral to behavioral health for scores 10 or greater.     In the past 7 days:  I have been able to laugh and see the funny side of things: As much as I always could  I have looked forward with enjoyment to things: As much as I ever did  I have blamed myself unnecessarily when things went wrong: No, never  I have been anxious or worried for no good reason: No, not at all  I have felt scared or panicky for no good reason: No, not at all  I haven't been able to cope lately: No, I have been coping as well as ever  I have been so unhappy that I have had difficulty sleeping: Not at all  I have felt sad or miserable: Not very often  I have been so unhappy that I have been crying: No, never  The thought of harming myself has occurred to me: Never  Total: 1  Postpartum Depression: Low Risk     Last EPDS Total Score: 4    Last EPDS Self Harm Result: Never        Past Medical History:   Diagnosis Date    COVID-19     Family history of colon cancer 07/19/2021    (1/7/22) Discussed genetic screening and will check with insurance    Rh incompatibility       No past surgical history on file.    Social History     Socioeconomic History    Marital status:      Spouse name: Not on file    Number of children: Not on file    Years of education: Not on file    Highest education level: Not on file   Occupational History    Not on file   Tobacco Use    Smoking status: Never    Smokeless tobacco: Never   Vaping Use    Vaping Use: Never used   Substance and Sexual Activity    Alcohol use: Not Currently    Drug use: Never    Sexual activity: Not Currently     Partners: Male   Other Topics Concern    Not on file   Social History Narrative    Not on file     Social Determinants of Health     Financial Resource Strain: Not on file   Food Insecurity: Not on file   Transportation Needs: Not on file   Physical Activity: Not on file   Stress: Not on file   Social Connections: Not on file   Intimate Partner Violence: Not on file   Housing Stability: Not on file      Allergies   Allergen Reactions    Seasonal         Current Outpatient Medications   Medication Sig Dispense Refill    Drospirenone 4 MG TABS Take 1 tablet by mouth daily 28 tablet 11    Prenatal Vit-Fe Fumarate-FA (PRENATAL VITAMIN) 27-0.8 MG TABS Take 1 tablet by mouth daily      ibuprofen (ADVIL;MOTRIN) 600 MG tablet Take 1 tablet by mouth every 6 hours as needed for Pain (Patient not taking: No sig reported) 30 tablet 0    ondansetron (ZOFRAN-ODT) 4 MG disintegrating tablet Take 1 tablet by mouth 3 times daily as needed for Nausea or Vomiting (Patient not taking: No sig reported) 21 tablet 0     No current facility-administered medications for this visit. OB History    Para Term  AB Living   2 2 2     2   SAB IAB Ectopic Molar Multiple Live Births           0 2      # Outcome Date GA Lbr Ottoniel/2nd Weight Sex Delivery Anes PTL Lv   2 Term 22 41w4d  8 lb 5.2 oz (3.775 kg) F Vag-Spont None N ARABELLA   1 Term 03/24/15 41w2d  7 lb 14 oz (3.572 kg) F Vag-Spont  N ARABELLA     Please select the appropriate diagnosis codes based on the ordered test:  Diagnostic: Select the appropriate diagnosis code(s)  Screen-Low Risk:          Screen-Low Risk (no cervix):          Screen-High Risk:        Please note here whether additional tests such as GC and/or    V72.69, V76.2                     V72.69, V76.47                       V72.69, V15.89              Danay Pernell is also being ordered from this specimen. Review of Systems: all 10 other organ systems reviewed and are negative unless otherwise noted in HPI    OBJECTIVE:   Wt Readings from Last 3 Encounters:   10/10/22 178 lb (80.7 kg)   22 178 lb 6.4 oz (80.9 kg)   22 196 lb (88.9 kg)       /70 (Site: Left Upper Arm, Position: Sitting, Cuff Size: Medium Adult)   Ht 5' 6\" (1.676 m)   Wt 178 lb (80.7 kg)   LMP 2021   Breastfeeding Yes   BMI 28.73 kg/m²     General:  appears alert, oriented and cooperative. Afebrile. Skin: Normal in appearance, texture, and temperature;  No lesions    Breast exam: No dimpling, nipple retraction or discharge. No masses or nodes. and Normal to inspection    Abdomen: Soft non-tender without guarding. There is diastasis present. The diastasis is 1 finger breaths of separation. Perineum: is normal, vagina and cvx WNL. Yellowish discharge noted. Anterior vaginal wall weakness w/ kegals noted. Adnexa- no malles, nontender. Large amount of stool noted during bimanual exam    Extremities: No calf tenderness bilaterally. No edema. Psychiatric: normal mood, well kept, good hygiene, adjusting well to motherhood. ASSESSMENT/PLAN:  Visit Diagnoses         Codes    Postpartum care and examination    -  Primary Z39.2    Lactating mother     Z39.1    Cervical cancer screening     Z12.4    Pelvic pain     R10.2    Chronic idiopathic constipation     K59.04            6 week postpartum visit  She will return for annual in 1 year  Labs were not ordered. Date of last pap:2017  breast feeding  Signs & Symptoms of mastitis reviewed; notify if occurs    Family planning needs  Family planning counseling was completed. 18 months bw births for spacing  Pt desires to start progesterone pills for pregnancy prevention    Orders Placed This Encounter   Procedures    PAP Smear     Patient History:    Patient's last menstrual period was 11/18/2021. OBGYN Status: Recent pregnancy  No past surgical history on file. Social History    Tobacco Use      Smoking status: Never      Smokeless tobacco: Never       Standing Status:   Future     Standing Expiration Date:   10/10/2023     Order Specific Question:   Collection Type     Answer: Thin Prep     Order Specific Question:   Prior Abnormal Pap Test     Answer:   No     Order Specific Question:   Screening or Diagnostic     Answer:   Screening     Order Specific Question:   HPV Requested?      Answer:   Yes     Order Specific Question:   High Risk Patient     Answer:   N/A    [facility]     Referral Priority: Routine     Referral Type:   Eval and Treat     Referral Reason:   Specialty Services Required     Requested Specialty:   Physical Therapist     Number of Visits Requested:   1        Orders Placed This Encounter   Medications    Drospirenone 4 MG TABS     Sig: Take 1 tablet by mouth daily     Dispense:  28 tablet     Refill:  11        The patient, Martin Pulido,  was seen with a total time spent of 30 minutes for the visit on this date of service by the [facility]  The time component, involved both face-to-face (counseling and education)  and non face-to-face time (care coordination), spent in determining the total time component.       Education   - pregnancy spacing   - postpartum mood disorders can occur anytime in the first year after giving birth

## 2022-10-10 ENCOUNTER — HOSPITAL ENCOUNTER (OUTPATIENT)
Age: 32
Setting detail: SPECIMEN
Discharge: HOME | End: 2022-10-10

## 2022-10-10 ENCOUNTER — POSTPARTUM VISIT (OUTPATIENT)
Dept: OBGYN CLINIC | Age: 32
End: 2022-10-10

## 2022-10-10 VITALS
HEIGHT: 66 IN | BODY MASS INDEX: 28.61 KG/M2 | WEIGHT: 178 LBS | SYSTOLIC BLOOD PRESSURE: 108 MMHG | DIASTOLIC BLOOD PRESSURE: 70 MMHG

## 2022-10-10 DIAGNOSIS — K59.04 CHRONIC IDIOPATHIC CONSTIPATION: ICD-10-CM

## 2022-10-10 DIAGNOSIS — Z12.4 CERVICAL CANCER SCREENING: ICD-10-CM

## 2022-10-10 DIAGNOSIS — R10.2 PELVIC PAIN: ICD-10-CM

## 2022-10-10 PROBLEM — O98.511 COVID-19 AFFECTING PREGNANCY IN FIRST TRIMESTER: Status: RESOLVED | Noted: 2022-02-18 | Resolved: 2022-10-10

## 2022-10-10 PROBLEM — O99.810 ABNORMAL GLUCOSE TOLERANCE TEST IN PREGNANCY: Status: RESOLVED | Noted: 2022-05-18 | Resolved: 2022-10-10

## 2022-10-10 PROBLEM — O48.0 POST-TERM PREGNANCY, 40-42 WEEKS OF GESTATION: Status: RESOLVED | Noted: 2022-09-05 | Resolved: 2022-10-10

## 2022-10-10 PROBLEM — Z28.21 TETANUS, DIPHTHERIA, AND ACELLULAR PERTUSSIS (TDAP) VACCINATION DECLINED: Status: RESOLVED | Noted: 2022-07-15 | Resolved: 2022-10-10

## 2022-10-10 PROBLEM — B95.1 POSITIVE GBS TEST: Status: RESOLVED | Noted: 2022-08-01 | Resolved: 2022-10-10

## 2022-10-10 PROBLEM — Z67.91 RH NEGATIVE STATE IN ANTEPARTUM PERIOD: Status: RESOLVED | Noted: 2022-02-21 | Resolved: 2022-10-10

## 2022-10-10 PROBLEM — O09.90 HRP (HIGH RISK PREGNANCY), UNSPECIFIED TRIMESTER: Status: RESOLVED | Noted: 2022-01-07 | Resolved: 2022-10-10

## 2022-10-10 PROBLEM — U07.1 COVID-19 AFFECTING PREGNANCY IN FIRST TRIMESTER: Status: RESOLVED | Noted: 2022-02-18 | Resolved: 2022-10-10

## 2022-10-10 PROBLEM — O44.02 PLACENTA PREVIA IN SECOND TRIMESTER: Status: RESOLVED | Noted: 2022-04-19 | Resolved: 2022-10-10

## 2022-10-10 PROBLEM — O26.899 RH NEGATIVE STATE IN ANTEPARTUM PERIOD: Status: RESOLVED | Noted: 2022-02-21 | Resolved: 2022-10-10

## 2022-10-10 PROBLEM — Z80.0 FAMILY HISTORY OF COLON CANCER: Status: RESOLVED | Noted: 2021-07-19 | Resolved: 2022-10-10

## 2022-10-10 PROBLEM — O09.90 HIGH RISK PREGNANCY, ANTEPARTUM: Status: RESOLVED | Noted: 2022-09-04 | Resolved: 2022-10-10

## 2022-10-10 NOTE — PROGRESS NOTES
Pt is here for her 6 wk pp visit. Pt is breast feeding and doing well. Does have questions about pumping and breast care, future pregnancies and working out.   Pt would like to discuss birthcontrol     EPDS-1

## 2022-10-13 LAB
HPV SAMPLE: NORMAL
HPV, GENOTYPE 16: NOT DETECTED
HPV, GENOTYPE 18: NOT DETECTED
HPV, HIGH RISK OTHER: NOT DETECTED
HPV, INTERPRETATION: NORMAL
SPECIMEN DESCRIPTION: NORMAL

## 2022-10-17 LAB — CYTOLOGY REPORT: NORMAL

## 2022-11-07 ENCOUNTER — PATIENT MESSAGE (OUTPATIENT)
Dept: OBGYN CLINIC | Age: 32
End: 2022-11-07

## 2022-11-07 DIAGNOSIS — Z30.011 ENCOUNTER FOR INITIAL PRESCRIPTION OF CONTRACEPTIVE PILLS: Primary | ICD-10-CM

## 2022-11-10 RX ORDER — ACETAMINOPHEN AND CODEINE PHOSPHATE 120; 12 MG/5ML; MG/5ML
1 SOLUTION ORAL DAILY
Qty: 28 TABLET | Refills: 5 | Status: SHIPPED | OUTPATIENT
Start: 2022-11-10

## 2022-11-10 NOTE — TELEPHONE ENCOUNTER
----- Message from Jamal Hernandez sent at 11/8/2022  8:59 AM EST -----  Regarding: FW: Problem with Slynd    ----- Message -----  From: Kiah Lou  Sent: 11/7/2022   4:20 PM EST  To: [phone] [facility] Clinical Staff  Subject: Problem with Henrine Screen, I had the pharmacy look up SLYND costs with the savings program you gave me the brochure for, but its 3 times as expensive as expected$75 for 3 months rather than $25 for 3 months. (The pharmacist said the $25 for 3 months is for patients with insurance.)  Are there any other options for me to take while Im still breastfeeding? I havent had a period yet, but my daughter sleeps 10-hour stretches at night (!), and Ive had quite a lot of stretchy cervical mucus, so Im concerned about being fertile earlier than expected.

## 2023-03-27 ENCOUNTER — HOSPITAL ENCOUNTER (OUTPATIENT)
Dept: PHYSICAL THERAPY | Facility: CLINIC | Age: 33
Setting detail: THERAPIES SERIES
Discharge: HOME | End: 2023-03-27

## 2023-03-27 NOTE — FLOWSHEET NOTE
[] [facility] ([facility] - [facility]  Therapy  [address]    [phone]  F: [phone]   [x] [address] [facility] [address]   [address]  P: [phone]  F: [phone]  [] [address] &  Therapy  [address]  P: [phone]  F: [phone] [] [address]  P: [phone]  F: [phone]  [] [address]  [facility]   [address]   [place]: [phone]  F: [phone]   [] [place] [id number] [facility] [address]  [place]: [phone]   F: [phone]     Physical Therapy Cancel/No Show note    Date: 3/27/2023  Patient: Junius Libman  : 1990  MRN: [id number]    Cancels/No Shows to date:     For today's appointment patient:    [x]  Cancelled    [] Rescheduled appointment    [] No-show     Reason given by patient:    []  Patient ill    []  Conflicting appointment    [] No transportation      [] Conflict with work    [] No reason given    [] Weather related    [] COVID-19    [x] Other:      Comments:  Pt called to cancel due to taking daughter to doctor.        [] Next appointment was confirmed    Electronically signed by: Mirian Walker, PT

## 2023-06-05 RX ORDER — NORETHINDRONE 0.35 MG/1
TABLET ORAL
Qty: 28 TABLET | Refills: 5 | Status: SHIPPED | OUTPATIENT
Start: 2023-06-05

## 2023-06-05 RX ORDER — ACETAMINOPHEN AND CODEINE PHOSPHATE 120; 12 MG/5ML; MG/5ML
1 SOLUTION ORAL DAILY
Qty: 28 TABLET | Refills: 5 | OUTPATIENT
Start: 2023-06-05

## 2023-06-06 RX ORDER — ACETAMINOPHEN AND CODEINE PHOSPHATE 120; 12 MG/5ML; MG/5ML
1 SOLUTION ORAL DAILY
Qty: 28 TABLET | Refills: 5 | OUTPATIENT
Start: 2023-06-06

## 2023-09-27 RX ORDER — ACETAMINOPHEN AND CODEINE PHOSPHATE 120; 12 MG/5ML; MG/5ML
1 SOLUTION ORAL DAILY
Qty: 28 TABLET | Refills: 5 | OUTPATIENT
Start: 2023-09-27

## 2023-11-22 ENCOUNTER — TELEPHONE (OUTPATIENT)
Dept: OBGYN CLINIC | Age: 33
End: 2023-11-22

## 2023-11-22 RX ORDER — NORETHINDRONE 0.35 MG/1
1 TABLET ORAL DAILY
Qty: 56 TABLET | Refills: 1 | Status: SHIPPED | OUTPATIENT
Start: 2023-11-22

## 2023-11-22 NOTE — TELEPHONE ENCOUNTER
Lindsay Larson is requesting a refill on Ariella 0.35 MG.      Last Annual visit:  10/10/22  Next Office visit:  2/2/2024     Currently Pregnant No      Last prescribing provider:  Luis F Cain CNM

## 2023-11-27 RX ORDER — ACETAMINOPHEN AND CODEINE PHOSPHATE 120; 12 MG/5ML; MG/5ML
1 SOLUTION ORAL DAILY
Qty: 56 TABLET | Refills: 1 | OUTPATIENT
Start: 2023-11-27

## 2024-02-09 ENCOUNTER — OFFICE VISIT (OUTPATIENT)
Dept: OBGYN CLINIC | Age: 34
End: 2024-02-09

## 2024-02-09 VITALS
SYSTOLIC BLOOD PRESSURE: 108 MMHG | BODY MASS INDEX: 28.82 KG/M2 | HEIGHT: 65 IN | WEIGHT: 173 LBS | DIASTOLIC BLOOD PRESSURE: 64 MMHG

## 2024-02-09 DIAGNOSIS — N94.6 DYSMENORRHEA: ICD-10-CM

## 2024-02-09 DIAGNOSIS — Z80.0 FAMILY HISTORY OF COLON CANCER: ICD-10-CM

## 2024-02-09 DIAGNOSIS — Z01.419 WOMEN'S ANNUAL ROUTINE GYNECOLOGICAL EXAMINATION: Primary | ICD-10-CM

## 2024-02-09 DIAGNOSIS — Z30.41 ENCOUNTER FOR SURVEILLANCE OF CONTRACEPTIVE PILLS: ICD-10-CM

## 2024-02-09 PROCEDURE — 99395 PREV VISIT EST AGE 18-39: CPT | Performed by: ADVANCED PRACTICE MIDWIFE

## 2024-02-09 RX ORDER — ACETAMINOPHEN AND CODEINE PHOSPHATE 120; 12 MG/5ML; MG/5ML
1 SOLUTION ORAL DAILY
Qty: 84 TABLET | Refills: 2 | Status: SHIPPED | OUTPATIENT
Start: 2024-02-09

## 2024-02-09 SDOH — ECONOMIC STABILITY: FOOD INSECURITY: WITHIN THE PAST 12 MONTHS, THE FOOD YOU BOUGHT JUST DIDN'T LAST AND YOU DIDN'T HAVE MONEY TO GET MORE.: NEVER TRUE

## 2024-02-09 SDOH — ECONOMIC STABILITY: FOOD INSECURITY: WITHIN THE PAST 12 MONTHS, YOU WORRIED THAT YOUR FOOD WOULD RUN OUT BEFORE YOU GOT MONEY TO BUY MORE.: NEVER TRUE

## 2024-02-09 ASSESSMENT — SOCIAL DETERMINANTS OF HEALTH (SDOH)
HOW HARD IS IT FOR YOU TO PAY FOR THE VERY BASICS LIKE FOOD, HOUSING, MEDICAL CARE, AND HEATING?: NOT HARD AT ALL
WITHIN THE LAST YEAR, HAVE YOU BEEN HUMILIATED OR EMOTIONALLY ABUSED IN OTHER WAYS BY YOUR PARTNER OR EX-PARTNER?: NO
WITHIN THE LAST YEAR, HAVE YOU BEEN AFRAID OF YOUR PARTNER OR EX-PARTNER?: NO
WITHIN THE LAST YEAR, HAVE TO BEEN RAPED OR FORCED TO HAVE ANY KIND OF SEXUAL ACTIVITY BY YOUR PARTNER OR EX-PARTNER?: NO
WITHIN THE LAST YEAR, HAVE YOU BEEN KICKED, HIT, SLAPPED, OR OTHERWISE PHYSICALLY HURT BY YOUR PARTNER OR EX-PARTNER?: NO

## 2024-02-09 ASSESSMENT — ANXIETY QUESTIONNAIRES
GAD7 TOTAL SCORE: 0
5. BEING SO RESTLESS THAT IT IS HARD TO SIT STILL: 0
3. WORRYING TOO MUCH ABOUT DIFFERENT THINGS: 0
2. NOT BEING ABLE TO STOP OR CONTROL WORRYING: 0
6. BECOMING EASILY ANNOYED OR IRRITABLE: 0
1. FEELING NERVOUS, ANXIOUS, OR ON EDGE: 0
4. TROUBLE RELAXING: 0
7. FEELING AFRAID AS IF SOMETHING AWFUL MIGHT HAPPEN: 0

## 2024-02-09 ASSESSMENT — PATIENT HEALTH QUESTIONNAIRE - PHQ9
1. LITTLE INTEREST OR PLEASURE IN DOING THINGS: 0
SUM OF ALL RESPONSES TO PHQ QUESTIONS 1-9: 0
SUM OF ALL RESPONSES TO PHQ9 QUESTIONS 1 & 2: 0
2. FEELING DOWN, DEPRESSED OR HOPELESS: 0

## 2024-02-09 ASSESSMENT — ENCOUNTER SYMPTOMS
EYES NEGATIVE: 1
GASTROINTESTINAL NEGATIVE: 1
RESPIRATORY NEGATIVE: 1
ALLERGIC/IMMUNOLOGIC NEGATIVE: 1

## 2024-02-09 NOTE — PROGRESS NOTES
Patient is here for her annual exam  Last pap was 10/10/22 wnl    Patient would like a refill of her Ariella    Chaperone for Intimate Exam  Chaperone was offered as part of the rooming process. Patient declined and agrees to continue with exam without a chaperone.  Chaperone: n/a       GAD7-0  PHQ2- 0

## 2024-05-22 DIAGNOSIS — N94.6 DYSMENORRHEA: Primary | ICD-10-CM

## 2024-05-22 RX ORDER — NORGESTIMATE AND ETHINYL ESTRADIOL 0.25-0.035
1 KIT ORAL DAILY
Qty: 1 PACKET | Refills: 3 | Status: SHIPPED | OUTPATIENT
Start: 2024-05-22

## 2024-06-24 ENCOUNTER — TELEPHONE (OUTPATIENT)
Dept: GASTROENTEROLOGY | Age: 34
End: 2024-06-24

## 2024-06-24 NOTE — TELEPHONE ENCOUNTER
Referral:  Family history of colon cancer   I do not see that the patient is established.  Due to age will need an OV first.

## 2024-07-11 ENCOUNTER — TELEPHONE (OUTPATIENT)
Dept: GASTROENTEROLOGY | Age: 34
End: 2024-07-11

## 2024-07-11 NOTE — TELEPHONE ENCOUNTER
Called patient to schedule colon she said that she is self pay and she needed to contact billing first and see how much she would have to pay first before she booked her procedure

## 2024-07-23 DIAGNOSIS — Z30.41 ENCOUNTER FOR SURVEILLANCE OF CONTRACEPTIVE PILLS: Primary | ICD-10-CM

## 2024-07-23 DIAGNOSIS — N94.6 DYSMENORRHEA: ICD-10-CM

## 2024-07-24 RX ORDER — NORGESTIMATE AND ETHINYL ESTRADIOL 0.25-0.035
1 KIT ORAL DAILY
Qty: 3 PACKET | Refills: 1 | Status: SHIPPED | OUTPATIENT
Start: 2024-07-24

## 2024-07-24 NOTE — TELEPHONE ENCOUNTER
Rx prepped for 90 day supply if appropriate     Felicity Savage is requesting a refill on 7/24/24.     Last Annual visit:  2/9/24  Next Office visit:  none scheduled       Last prescribing provider:  Karis GILLILAND

## 2025-01-29 DIAGNOSIS — N94.6 DYSMENORRHEA: ICD-10-CM

## 2025-01-29 DIAGNOSIS — Z30.41 ENCOUNTER FOR SURVEILLANCE OF CONTRACEPTIVE PILLS: ICD-10-CM

## 2025-01-30 NOTE — TELEPHONE ENCOUNTER
Felicity Savage is requesting a refill on 1/30/25.     Last Annual visit:  2/9/24  Next Office visit:  none scheduled     Currently Pregnant n/a  Last OB visit: n/a  Next OB visit: n/a    Last prescribing provider:  Candice GILLILAND

## 2025-01-31 RX ORDER — NORGESTIMATE AND ETHINYL ESTRADIOL 0.25-0.035
1 KIT ORAL DAILY
Qty: 3 PACKET | Refills: 0 | Status: SHIPPED | OUTPATIENT
Start: 2025-01-31

## 2025-06-06 ENCOUNTER — TELEPHONE (OUTPATIENT)
Dept: OBGYN | Age: 35
End: 2025-06-06

## 2025-06-15 DIAGNOSIS — Z30.41 ENCOUNTER FOR SURVEILLANCE OF CONTRACEPTIVE PILLS: ICD-10-CM

## 2025-06-15 DIAGNOSIS — N94.6 DYSMENORRHEA: ICD-10-CM

## 2025-06-16 RX ORDER — NORGESTIMATE AND ETHINYL ESTRADIOL 0.25-0.035
1 KIT ORAL DAILY
Qty: 3 PACKET | Refills: 0 | Status: SHIPPED | OUTPATIENT
Start: 2025-06-16

## 2025-06-16 NOTE — TELEPHONE ENCOUNTER
Last pap 2/9/24  Last refill 3/14/25  Was told she needed an appt for more refills, pt scheduled for 9/5/25 with CS

## 2025-06-24 ENCOUNTER — TELEPHONE (OUTPATIENT)
Dept: GASTROENTEROLOGY | Age: 35
End: 2025-06-24

## 2025-06-24 ENCOUNTER — PATIENT MESSAGE (OUTPATIENT)
Dept: OBGYN CLINIC | Age: 35
End: 2025-06-24

## 2025-06-24 DIAGNOSIS — Z80.0 FAMILY HISTORY OF COLON CANCER: Primary | ICD-10-CM

## 2025-06-24 NOTE — TELEPHONE ENCOUNTER
Patient has referral to get a colonoscopy, she states there is a strong history of colon cancer in her family, and would like to get a colonoscopy she will be self pay, please call patient if she can schedule, [phone] [facility]/sc

## 2025-06-25 NOTE — TELEPHONE ENCOUNTER
Writer attempted to schedule patient for OV prior to procedure due to her age, per patient she would like to avoid the OV however patient will still need OV. Patient decided to schedule OV at [facility] with haghbin on 8/7/25 and discuss on date of visit colonoscopy scheduling.

## 2025-08-04 ENCOUNTER — OFFICE VISIT (OUTPATIENT)
Dept: OBGYN CLINIC | Age: 35
End: 2025-08-04

## 2025-08-04 VITALS
DIASTOLIC BLOOD PRESSURE: 62 MMHG | RESPIRATION RATE: 16 BRPM | HEART RATE: 79 BPM | HEIGHT: 65 IN | BODY MASS INDEX: 27.52 KG/M2 | WEIGHT: 165.2 LBS | SYSTOLIC BLOOD PRESSURE: 108 MMHG

## 2025-08-04 DIAGNOSIS — Z00.00 WELL WOMAN EXAM WITHOUT GYNECOLOGICAL EXAM: Primary | ICD-10-CM

## 2025-08-04 DIAGNOSIS — Z30.41 ENCOUNTER FOR SURVEILLANCE OF CONTRACEPTIVE PILLS: ICD-10-CM

## 2025-08-04 DIAGNOSIS — N94.6 DYSMENORRHEA: ICD-10-CM

## 2025-08-04 PROCEDURE — 99395 PREV VISIT EST AGE 18-39: CPT | Performed by: CLINICAL NURSE SPECIALIST

## 2025-08-04 RX ORDER — VITAMIN A ACETATE, .BETA.-CAROTENE, ASCORBIC ACID, CHOLECALCIFEROL, .ALPHA.-TOCOPHEROL ACETATE, DL-, THIAMINE MONONITRATE, RIBOFLAVIN, NIACINAMIDE, PYRIDOXINE HYDROCHLORIDE, FOLIC ACID, CYANOCOBALAMIN, CALCIUM CARBONATE, FERROUS FUMARATE, ZINC OXIDE, AND CUPRIC OXIDE 2000; 2000; 120; 400; 22; 1.84; 3; 20; 10; 1; 12; 200; 27; 25; 2 [IU]/1; [IU]/1; MG/1; [IU]/1; MG/1; MG/1; MG/1; MG/1; MG/1; MG/1; UG/1; MG/1; MG/1; MG/1; MG/1
1 TABLET ORAL DAILY
Qty: 90 TABLET | Refills: 3 | Status: SHIPPED | OUTPATIENT
Start: 2025-08-04 | End: 2025-11-02

## 2025-08-04 RX ORDER — NORGESTIMATE AND ETHINYL ESTRADIOL 0.25-0.035
1 KIT ORAL DAILY
Qty: 3 PACKET | Refills: 3 | Status: SHIPPED | OUTPATIENT
Start: 2025-08-04

## 2025-08-04 SDOH — ECONOMIC STABILITY: FOOD INSECURITY: WITHIN THE PAST 12 MONTHS, THE FOOD YOU BOUGHT JUST DIDN'T LAST AND YOU DIDN'T HAVE MONEY TO GET MORE.: NEVER TRUE

## 2025-08-04 SDOH — ECONOMIC STABILITY: FOOD INSECURITY: WITHIN THE PAST 12 MONTHS, YOU WORRIED THAT YOUR FOOD WOULD RUN OUT BEFORE YOU GOT MONEY TO BUY MORE.: NEVER TRUE

## 2025-08-04 ASSESSMENT — PATIENT HEALTH QUESTIONNAIRE - PHQ9
SUM OF ALL RESPONSES TO PHQ QUESTIONS 1-9: 0
SUM OF ALL RESPONSES TO PHQ QUESTIONS 1-9: 0
1. LITTLE INTEREST OR PLEASURE IN DOING THINGS: NOT AT ALL
2. FEELING DOWN, DEPRESSED OR HOPELESS: NOT AT ALL
SUM OF ALL RESPONSES TO PHQ QUESTIONS 1-9: 0
SUM OF ALL RESPONSES TO PHQ QUESTIONS 1-9: 0

## 2025-08-07 ENCOUNTER — OFFICE VISIT (OUTPATIENT)
Dept: GASTROENTEROLOGY | Age: 35
End: 2025-08-07

## 2025-08-07 ENCOUNTER — TELEPHONE (OUTPATIENT)
Dept: GASTROENTEROLOGY | Age: 35
End: 2025-08-07

## 2025-08-07 VITALS
WEIGHT: 165 LBS | BODY MASS INDEX: 27.49 KG/M2 | SYSTOLIC BLOOD PRESSURE: 108 MMHG | HEIGHT: 65 IN | OXYGEN SATURATION: 98 % | DIASTOLIC BLOOD PRESSURE: 72 MMHG | HEART RATE: 76 BPM

## 2025-08-07 DIAGNOSIS — Z80.0 FAMILY HISTORY OF COLON CANCER IN MOTHER: ICD-10-CM

## 2025-08-07 DIAGNOSIS — D64.9 ANEMIA, UNSPECIFIED TYPE: ICD-10-CM

## 2025-08-07 DIAGNOSIS — Z80.0 FAMILY HISTORY OF MALIGNANT NEOPLASM OF COLON: ICD-10-CM

## 2025-08-07 DIAGNOSIS — Z80.0 FAMILY HISTORY OF COLON CANCER IN FATHER: ICD-10-CM

## 2025-08-07 DIAGNOSIS — Z12.11 ENCOUNTER FOR SCREENING FOR MALIGNANT NEOPLASM OF COLON: Primary | ICD-10-CM

## 2025-08-07 PROBLEM — N81.89 PELVIC FLOOR WEAKNESS IN FEMALE: Status: ACTIVE | Noted: 2025-08-07

## 2025-08-07 PROCEDURE — 99204 OFFICE O/P NEW MOD 45 MIN: CPT | Performed by: STUDENT IN AN ORGANIZED HEALTH CARE EDUCATION/TRAINING PROGRAM

## 2025-08-07 RX ORDER — POLYETHYLENE GLYCOL 3350, SODIUM CHLORIDE, SODIUM BICARBONATE, POTASSIUM CHLORIDE 420; 11.2; 5.72; 1.48 G/4L; G/4L; G/4L; G/4L
POWDER, FOR SOLUTION ORAL
Qty: 4000 ML | Refills: 0 | Status: SHIPPED | OUTPATIENT
Start: 2025-08-07

## 2025-08-07 RX ORDER — IBUPROFEN 200 MG
200 TABLET ORAL EVERY 6 HOURS PRN
COMMUNITY

## 2025-08-07 RX ORDER — BISACODYL 5 MG
TABLET, DELAYED RELEASE (ENTERIC COATED) ORAL
Qty: 4 TABLET | Refills: 0 | Status: SHIPPED | OUTPATIENT
Start: 2025-08-07

## 2025-08-22 ENCOUNTER — ANESTHESIA EVENT (OUTPATIENT)
Dept: ENDOSCOPY | Age: 35
End: 2025-08-22

## 2025-08-25 ENCOUNTER — ANESTHESIA (OUTPATIENT)
Dept: ENDOSCOPY | Age: 35
End: 2025-08-25

## 2025-08-25 ENCOUNTER — HOSPITAL ENCOUNTER (OUTPATIENT)
Age: 35
Setting detail: OUTPATIENT SURGERY
Discharge: HOME | End: 2025-08-25
Attending: STUDENT IN AN ORGANIZED HEALTH CARE EDUCATION/TRAINING PROGRAM | Admitting: STUDENT IN AN ORGANIZED HEALTH CARE EDUCATION/TRAINING PROGRAM

## 2025-08-25 VITALS
RESPIRATION RATE: 18 BRPM | BODY MASS INDEX: 26.66 KG/M2 | HEIGHT: 65 IN | DIASTOLIC BLOOD PRESSURE: 71 MMHG | OXYGEN SATURATION: 98 % | HEART RATE: 74 BPM | TEMPERATURE: 97 F | SYSTOLIC BLOOD PRESSURE: 99 MMHG | WEIGHT: 160 LBS

## 2025-08-25 DIAGNOSIS — Z80.0 FAMILY HISTORY OF COLON CANCER: ICD-10-CM

## 2025-08-25 DIAGNOSIS — Z12.11 SCREEN FOR COLON CANCER: ICD-10-CM

## 2025-08-25 PROBLEM — K64.8 INTERNAL HEMORRHOID: Status: ACTIVE | Noted: 2025-08-25

## 2025-08-25 PROBLEM — K62.1 RECTAL POLYP: Status: ACTIVE | Noted: 2025-08-25

## 2025-08-25 LAB — HCG, PREGNANCY URINE (POC): NEGATIVE

## 2025-08-25 PROCEDURE — 3609010600 HC COLONOSCOPY POLYPECTOMY SNARE/COLD BIOPSY: Performed by: STUDENT IN AN ORGANIZED HEALTH CARE EDUCATION/TRAINING PROGRAM

## 2025-08-25 PROCEDURE — 6360000002 HC RX W HCPCS: Performed by: ANESTHESIOLOGY

## 2025-08-25 PROCEDURE — 2709999900 HC NON-CHARGEABLE SUPPLY: Performed by: STUDENT IN AN ORGANIZED HEALTH CARE EDUCATION/TRAINING PROGRAM

## 2025-08-25 PROCEDURE — 2580000003 HC RX 258: Performed by: ANESTHESIOLOGY

## 2025-08-25 PROCEDURE — 6360000002 HC RX W HCPCS: Performed by: NURSE ANESTHETIST, CERTIFIED REGISTERED

## 2025-08-25 PROCEDURE — 3700000001 HC ADD 15 MINUTES (ANESTHESIA): Performed by: STUDENT IN AN ORGANIZED HEALTH CARE EDUCATION/TRAINING PROGRAM

## 2025-08-25 PROCEDURE — 88305 TISSUE EXAM BY PATHOLOGIST: CPT

## 2025-08-25 PROCEDURE — 81025 URINE PREGNANCY TEST: CPT

## 2025-08-25 PROCEDURE — 7100000010 HC PHASE II RECOVERY - FIRST 15 MIN: Performed by: STUDENT IN AN ORGANIZED HEALTH CARE EDUCATION/TRAINING PROGRAM

## 2025-08-25 PROCEDURE — 3700000000 HC ANESTHESIA ATTENDED CARE: Performed by: STUDENT IN AN ORGANIZED HEALTH CARE EDUCATION/TRAINING PROGRAM

## 2025-08-25 PROCEDURE — 7100000011 HC PHASE II RECOVERY - ADDTL 15 MIN: Performed by: STUDENT IN AN ORGANIZED HEALTH CARE EDUCATION/TRAINING PROGRAM

## 2025-08-25 RX ORDER — SODIUM CHLORIDE 9 MG/ML
INJECTION, SOLUTION INTRAVENOUS PRN
Status: DISCONTINUED | OUTPATIENT
Start: 2025-08-25 | End: 2025-08-25 | Stop reason: HOSPADM

## 2025-08-25 RX ORDER — SODIUM CHLORIDE, SODIUM LACTATE, POTASSIUM CHLORIDE, CALCIUM CHLORIDE 600; 310; 30; 20 MG/100ML; MG/100ML; MG/100ML; MG/100ML
INJECTION, SOLUTION INTRAVENOUS CONTINUOUS
Status: DISCONTINUED | OUTPATIENT
Start: 2025-08-25 | End: 2025-08-25 | Stop reason: HOSPADM

## 2025-08-25 RX ORDER — SODIUM CHLORIDE 0.9 % (FLUSH) 0.9 %
5-40 SYRINGE (ML) INJECTION EVERY 12 HOURS SCHEDULED
Status: DISCONTINUED | OUTPATIENT
Start: 2025-08-25 | End: 2025-08-25 | Stop reason: HOSPADM

## 2025-08-25 RX ORDER — PROPOFOL 10 MG/ML
INJECTION, EMULSION INTRAVENOUS
Status: DISCONTINUED | OUTPATIENT
Start: 2025-08-25 | End: 2025-08-25 | Stop reason: SDUPTHER

## 2025-08-25 RX ORDER — SODIUM CHLORIDE 0.9 % (FLUSH) 0.9 %
5-40 SYRINGE (ML) INJECTION PRN
Status: DISCONTINUED | OUTPATIENT
Start: 2025-08-25 | End: 2025-08-25 | Stop reason: HOSPADM

## 2025-08-25 RX ORDER — LIDOCAINE HYDROCHLORIDE 10 MG/ML
1 INJECTION, SOLUTION EPIDURAL; INFILTRATION; INTRACAUDAL; PERINEURAL
Status: COMPLETED | OUTPATIENT
Start: 2025-08-25 | End: 2025-08-25

## 2025-08-25 RX ORDER — LIDOCAINE HYDROCHLORIDE 20 MG/ML
INJECTION, SOLUTION EPIDURAL; INFILTRATION; INTRACAUDAL; PERINEURAL
Status: DISCONTINUED | OUTPATIENT
Start: 2025-08-25 | End: 2025-08-25 | Stop reason: SDUPTHER

## 2025-08-25 RX ADMIN — LIDOCAINE HYDROCHLORIDE 60 MG: 20 INJECTION, SOLUTION EPIDURAL; INFILTRATION; INTRACAUDAL; PERINEURAL at 13:22

## 2025-08-25 RX ADMIN — PROPOFOL 50 MG: 10 INJECTION, EMULSION INTRAVENOUS at 13:22

## 2025-08-25 RX ADMIN — SODIUM CHLORIDE, POTASSIUM CHLORIDE, SODIUM LACTATE AND CALCIUM CHLORIDE: 600; 310; 30; 20 INJECTION, SOLUTION INTRAVENOUS at 11:34

## 2025-08-25 RX ADMIN — PROPOFOL 150 MCG/KG/MIN: 10 INJECTION, EMULSION INTRAVENOUS at 13:24

## 2025-08-25 RX ADMIN — LIDOCAINE HYDROCHLORIDE 1 ML: 10 INJECTION, SOLUTION EPIDURAL; INFILTRATION; INTRACAUDAL; PERINEURAL at 11:34

## 2025-08-25 RX ADMIN — PROPOFOL 20 MG: 10 INJECTION, EMULSION INTRAVENOUS at 13:23

## 2025-08-25 ASSESSMENT — PAIN - FUNCTIONAL ASSESSMENT
PAIN_FUNCTIONAL_ASSESSMENT: 0-10
PAIN_FUNCTIONAL_ASSESSMENT: 0-10

## 2025-08-27 LAB — SURGICAL PATHOLOGY REPORT: NORMAL

## (undated) DEVICE — ENDOSCOPIC KIT 1.1+ 10 FT OP4 CA DE

## (undated) DEVICE — VALVE SUCTION AIR H2O SET ORCA POD + DISP

## (undated) DEVICE — Device

## (undated) DEVICE — GLOVE ORANGE PI 8 1/2   MSG9085

## (undated) DEVICE — FORCEPS BX L240CM JAW DIA2.8MM L CAP W/ NDL MIC MESH TOOTH